# Patient Record
Sex: FEMALE | Race: WHITE | Employment: OTHER | URBAN - METROPOLITAN AREA
[De-identification: names, ages, dates, MRNs, and addresses within clinical notes are randomized per-mention and may not be internally consistent; named-entity substitution may affect disease eponyms.]

---

## 2021-07-24 ENCOUNTER — HOSPITAL ENCOUNTER (EMERGENCY)
Age: 81
Discharge: HOME | End: 2021-07-24
Attending: EMERGENCY MEDICINE

## 2021-07-24 ENCOUNTER — APPOINTMENT (OUTPATIENT)
Dept: GENERAL RADIOLOGY | Age: 81
End: 2021-07-24

## 2021-07-24 ENCOUNTER — APPOINTMENT (OUTPATIENT)
Dept: CT IMAGING | Age: 81
End: 2021-07-24

## 2021-07-24 VITALS
TEMPERATURE: 98.5 F | HEIGHT: 62 IN | OXYGEN SATURATION: 96 % | DIASTOLIC BLOOD PRESSURE: 75 MMHG | HEART RATE: 87 BPM | WEIGHT: 100 LBS | BODY MASS INDEX: 18.4 KG/M2 | RESPIRATION RATE: 20 BRPM | SYSTOLIC BLOOD PRESSURE: 139 MMHG

## 2021-07-24 DIAGNOSIS — S42.221A CLOSED 2-PART DISPLACED FRACTURE OF SURGICAL NECK OF RIGHT HUMERUS, INITIAL ENCOUNTER: Primary | ICD-10-CM

## 2021-07-24 PROCEDURE — 6370000000 HC RX 637 (ALT 250 FOR IP): Performed by: STUDENT IN AN ORGANIZED HEALTH CARE EDUCATION/TRAINING PROGRAM

## 2021-07-24 PROCEDURE — 70450 CT HEAD/BRAIN W/O DYE: CPT

## 2021-07-24 PROCEDURE — 73060 X-RAY EXAM OF HUMERUS: CPT

## 2021-07-24 PROCEDURE — 73030 X-RAY EXAM OF SHOULDER: CPT

## 2021-07-24 PROCEDURE — 99285 EMERGENCY DEPT VISIT HI MDM: CPT

## 2021-07-24 RX ORDER — HYDROCODONE BITARTRATE AND ACETAMINOPHEN 5; 325 MG/1; MG/1
1 TABLET ORAL ONCE
Status: COMPLETED | OUTPATIENT
Start: 2021-07-24 | End: 2021-07-24

## 2021-07-24 RX ORDER — HYDROCODONE BITARTRATE AND ACETAMINOPHEN 5; 325 MG/1; MG/1
1 TABLET ORAL EVERY 6 HOURS PRN
Qty: 10 TABLET | Refills: 0 | Status: SHIPPED | OUTPATIENT
Start: 2021-07-24 | End: 2021-07-27

## 2021-07-24 RX ADMIN — HYDROCODONE BITARTRATE AND ACETAMINOPHEN 1 TABLET: 5; 325 TABLET ORAL at 09:05

## 2021-07-24 RX ADMIN — HYDROCODONE BITARTRATE AND ACETAMINOPHEN 1 TABLET: 5; 325 TABLET ORAL at 13:21

## 2021-07-24 ASSESSMENT — ENCOUNTER SYMPTOMS
NAUSEA: 0
VOMITING: 0
CHEST TIGHTNESS: 0
SINUS PAIN: 0
EYE PAIN: 0
ABDOMINAL PAIN: 0
SHORTNESS OF BREATH: 0
COUGH: 0
SINUS PRESSURE: 0
EYE REDNESS: 0

## 2021-07-24 ASSESSMENT — PAIN DESCRIPTION - FREQUENCY: FREQUENCY: INTERMITTENT

## 2021-07-24 ASSESSMENT — PAIN SCALES - GENERAL
PAINLEVEL_OUTOF10: 8
PAINLEVEL_OUTOF10: 9
PAINLEVEL_OUTOF10: 9

## 2021-07-24 ASSESSMENT — PAIN DESCRIPTION - LOCATION: LOCATION: SHOULDER

## 2021-07-24 ASSESSMENT — PAIN DESCRIPTION - DESCRIPTORS: DESCRIPTORS: SHARP

## 2021-07-24 ASSESSMENT — PAIN DESCRIPTION - ORIENTATION: ORIENTATION: RIGHT

## 2021-07-24 ASSESSMENT — PAIN DESCRIPTION - PAIN TYPE: TYPE: ACUTE PAIN

## 2021-07-24 NOTE — CONSULTS
Department of Orthopedic Surgery  Resident Consult Note    Reason for Consult: Right shoulder pain    HISTORY OF PRESENT ILLNESS:       Patient is a [de-identified] y.o. female, right-hand-dominant, who presents with complaint of right shoulder pain. She sustained a fall in her kitchen yesterday in which she landed on the right upper extremity. She noted immediate pain to the proximal aspect of the humerus. She denies striking her head or any loss of consciousness with the fall. She was able to ambulate after falling without pain to the lower extremities. She also has no pain to the left upper extremity. She has no numbness or tingling to the extremities. No chest pain or difficulty breathing. Patient has no history of surgeries to the extremities. She ambulates without assistive devices. No additional complaints at this time. Past Medical History:    History reviewed. No pertinent past medical history. Past Surgical History:    History reviewed. No pertinent surgical history. Current Medications:   No current facility-administered medications for this encounter. Allergies:  Patient has no known allergies. Social History:   TOBACCO:   reports that she has never smoked. She has never used smokeless tobacco.  ETOH:   reports no history of alcohol use. DRUGS:   reports no history of drug use. ACTIVITIES OF DAILY LIVING: Community ambulator  OCCUPATION: Retired  Family History:   History reviewed. No pertinent family history.     REVIEW OF SYSTEMS:  CONSTITUTIONAL:  negative for  fevers, chills  EYES:  negative for blurred vision, visual disturbance  HEENT:  negative for  hearing loss, voice change  RESPIRATORY:  negative for  dyspnea, wheezing  CARDIOVASCULAR:  negative for  chest pain, palpitations  GASTROINTESTINAL:  negative for nausea, vomiting  GENITOURINARY:  negative for frequency, urinary incontinence  HEMATOLOGIC/LYMPHATIC:  negative for bleeding and petechiae  MUSCULOSKELETAL: See HPI  NEUROLOGICAL: negative for headaches, dizziness  BEHAVIOR/PSYCH:  negative for increased agitation and anxiety    PHYSICAL EXAM:    VITALS:  /75   Pulse 87   Temp 98.5 °F (36.9 °C) (Oral)   Resp 20   Ht 5' 2\" (1.575 m)   Wt 100 lb (45.4 kg)   SpO2 96%   BMI 18.29 kg/m²   CONSTITUTIONAL: Alert, oriented, and cooperative  MUSCULOSKELETAL:  Right upper Extremity:  · Significant ecchymosis to the medial and lateral aspect of the proximal humerus. There is crepitance and tenderness to palpation in this region  · No significant active range of motion to the right shoulder secondary to pain  · Minimal tenderness to palpation over the medial lateral epicondyle, patient reluctant to move her elbow secondary to pain at the fracture site proximally  · Sensation grossly intact in the axillary, median, radial, and ulnar nerve distributions  · Radial pulse +2/4  · Motor function intact to AIN, PIN, median, radial, and ulnar nerves  · No tenderness to palpation about the wrist or hand  · Compartments soft and compressible    Secondary Exam:   · leftUE: No obvious signs of trauma. -TTP to fingers, hand, wrist, forearm, elbow, humerus, shoulder or clavicle. -- Patient able to flex/extend fingers, wrist, elbow and shoulder with active and passive ROM without pain, +2/4 Radial pulse, cap refill <3sec, +AIN/PIN/Radial/Ulnar/Median N, distal sensation grossly intact to C4-T1 dermatomes, compartments soft and compressible. · bilateralLE: No obvious signs of trauma. -TTP to foot, ankle, leg, knee, thigh, hip.-- Patient able to flex/extend toes, ankle, knee and hip with active and passive ROM without pain,+2/4 DP & PT pulses, cap refill <3sec, +5/5 PF/DF/EHL, distal sensation grossly intact to L4-S1 dermatomes, compartments soft and compressible.     · Pelvis: -TTP, -Log roll, -Heel strike     DATA:    CBC: No results found for: WBC, RBC, HGB, HCT, MCV, MCH, MCHC, RDW, PLT, MPV  PT/INR:  No results found for: PROTIME, INR    Radiology Review:  X-ray imaging of the right shoulder and humerus reviewed. There is a comminuted fracture involving the proximal humerus. Predominant fracture line is transverse at the surgical neck. No evident dislocation of the glenohumeral joint. Poor bone quality overall.     IMPRESSION:  · Right, closed, proximal humerus fracture      PLAN:  · Nonweightbearing to right upper extremity  · Right upper extremity placed in cuff and collar  · Ice the affected area  · Pain medications per emergency department  · Follow-up with Dr. Yuliana Webster in clinic in the next week  · Discuss with attending

## 2021-07-24 NOTE — ED PROVIDER NOTES
[address] [facility]  Department of Emergency Medicine     Written by: Julissa Alvarenga DO  Patient Name: Anaya Hart  Attending Provider: Antonia Kaye DO  Admit Date: 2021  8:04 AM  MRN: [id number]                   : 1940        Chief Complaint   Patient presents with   [address] [facility] [address]    Shoulder Pain    - Chief complaint    Ms. Risa Soares is an [de-identified] yo female without significant medical history who presents to the ED due to a fall. Per patient and her son in room, she was sitting in a barstool at home last night around 7 PM when she went to get up, tripped and hit the barstool with her right upper arm as she was falling to the ground. She was in severe, sharp pain on the ground and required her son's assistance to help her to the couch. She and her son both deny any loss of consciousness during this episode. She reported to the ED this morning as she is still in severe, constant, 9/10 pain and now unable to move her right shoulder. Pain is aggravated with any palpation of the right upper arm/shoulder and only slightly relieved at rest and with tylenol. She denies any headache, vision changes, dizziness, lightheadedness, chest pain or shortness of breath. She denies any systemic symptoms. Review of Systems   Constitutional: Negative for chills, fatigue and fever. HENT: Negative for congestion, sinus pressure and sinus pain. Eyes: Negative for pain and redness. Respiratory: Negative for cough, chest tightness and shortness of breath. Cardiovascular: Negative for chest pain, palpitations and leg swelling. Gastrointestinal: Negative for abdominal pain, nausea and vomiting. Genitourinary: Negative for dysuria, flank pain, frequency, hematuria and urgency. Musculoskeletal: Positive for arthralgias (R shoulder), joint swelling (R shoulder) and myalgias (R bicep). Skin: Negative for rash. Neurological: Negative for dizziness, light-headedness and headaches.         Physical Exam  Constitutional:       Appearance: Normal appearance. She is normal weight. HENT:      Head: Normocephalic. Right Ear: External ear normal.      Left Ear: External ear normal.   Eyes:      Extraocular Movements: Extraocular movements intact. Conjunctiva/sclera: Conjunctivae normal.   Cardiovascular:      Rate and Rhythm: Normal rate and regular rhythm. Pulses: Normal pulses. Heart sounds: Normal heart sounds. Pulmonary:      Effort: Pulmonary effort is normal.      Breath sounds: Normal breath sounds. Abdominal:      General: Abdomen is flat. Bowel sounds are normal.      Palpations: Abdomen is soft. Musculoskeletal:         General: Swelling (R shoulder/bicep), tenderness (R shoulder/bicep) and signs of injury present. Cervical back: Normal range of motion and neck supple. Comments: Decreased ROM of R shoulder     Skin:     General: Skin is warm and dry. Capillary Refill: Capillary refill takes less than 2 seconds. Findings: Bruising (Over R bicep) present. No erythema. Neurological:      General: No focal deficit present. Mental Status: She is alert and oriented to person, place, and time. Mental status is at baseline. Psychiatric:         Mood and Affect: Mood normal.         Behavior: Behavior normal.          Procedures       MDM  Number of Diagnoses or Management Options  Closed 2-part displaced fracture of surgical neck of right humerus, initial encounter  Diagnosis management comments: This is an [de-identified] yo female without significant medical history who presents to the ED due to a fall. She remains in pain on presentation. Due to unclear nature of fall and if she hit her head or not, Head CT was ordered. Will also obtain x-ray of right shoulder and humerus. Will give norco for her pain. Head CT was negative. X-ray revealed a comminuted fracture in the surgical neck of the humerus with mild lateral angulation.  Ortho has been consulted and will come down to the department to see her. Ortho placed patient in a sling and patient will be discharged home with instructions to follow up with them as an outpatient.                  --------------------------------------------- PAST HISTORY ---------------------------------------------  Past Medical History:  has no past medical history on file. Past Surgical History:  has no past surgical history on file. Social History:  reports that she has never smoked. She has never used smokeless tobacco. She reports that she does not drink alcohol and does not use drugs. Family History: family history is not on file. The patients home medications have been reviewed. Allergies: Patient has no known allergies. -------------------------------------------------- RESULTS -------------------------------------------------  Labs:  No results found for this visit on 07/24/21. Radiology:  CT Head WO Contrast   Final Result   No acute intracranial abnormality. XR HUMERUS RIGHT (MIN 2 VIEWS)   Final Result   Comminuted fracture in the surgical neck of the humerus with mild lateral   angulation. XR SHOULDER RIGHT (MIN 2 VIEWS)   Final Result   Comminuted fracture in the surgical neck of the humerus with mild lateral   angulation.             ------------------------- NURSING NOTES AND VITALS REVIEWED ---------------------------  Date / Time Roomed:  7/24/2021  8:04 AM  ED Bed Assignment:  03/03    The nursing notes within the ED encounter and vital signs as below have been reviewed.    /75   Pulse 87   Temp 98.5 °F (36.9 °C) (Oral)   Resp 20   Ht 5' 2\" (1.575 m)   Wt 100 lb (45.4 kg)   SpO2 96%   BMI 18.29 kg/m²   Oxygen Saturation Interpretation: Normal      ------------------------------------------ PROGRESS NOTES ------------------------------------------  2:34 PM EDT  I have spoken with the patient and discussed todays results, in addition to providing specific details for the plan of care and counseling regarding the diagnosis and prognosis. Their questions are answered at this time and they are agreeable with the plan. I discussed at length with them reasons for immediate return here for re evaluation. They will followup with their primary care physician and orthopedic physician by calling their office on Monday.      --------------------------------- ADDITIONAL PROVIDER NOTES ---------------------------------  At this time the patient is without objective evidence of an acute process requiring hospitalization or inpatient management. They have remained hemodynamically stable throughout their entire ED visit and are stable for discharge with outpatient follow-up. The plan has been discussed in detail and they are aware of the specific conditions for emergent return, as well as the importance of follow-up. New Prescriptions    HYDROCODONE-ACETAMINOPHEN (NORCO) 5-325 MG PER TABLET    Take 1 tablet by mouth every 6 hours as needed for Pain for up to 3 days. Intended supply: 3 days. Take lowest dose possible to manage pain       Diagnosis:  1. Closed 2-part displaced fracture of surgical neck of right humerus, initial encounter Controlled       Disposition:  Patient's disposition: Discharge to home  Patient's condition is stable. Patient was seen and evaluated by myself and my attending Raoul DO Riki. Assessment and Plan discussed with attending provider, please see attestation for final plan of care.      Marylee Blue, DO Marylee Blue, DO  Resident  07/24/21 [id number]

## 2021-07-24 NOTE — ED NOTES
Bed: 03  Expected date:   Expected time:   Means of arrival:   Comments:  dillon Sylvester RN  07/24/21 [id number]

## 2021-08-02 ENCOUNTER — TELEPHONE (OUTPATIENT)
Dept: ADMINISTRATIVE | Age: 81
End: 2021-08-02

## 2021-08-02 ENCOUNTER — OFFICE VISIT (OUTPATIENT)
Dept: ORTHOPEDIC SURGERY | Age: 81
End: 2021-08-02
Payer: MEDICARE

## 2021-08-02 VITALS — BODY MASS INDEX: 16.66 KG/M2 | TEMPERATURE: 98 F | WEIGHT: 100 LBS | HEIGHT: 65 IN

## 2021-08-02 DIAGNOSIS — S42.211A CLOSED DISPLACED FRACTURE OF SURGICAL NECK OF RIGHT HUMERUS, UNSPECIFIED FRACTURE MORPHOLOGY, INITIAL ENCOUNTER: Primary | ICD-10-CM

## 2021-08-02 DIAGNOSIS — M25.511 ACUTE PAIN OF RIGHT SHOULDER: Primary | ICD-10-CM

## 2021-08-02 PROCEDURE — G8419 CALC BMI OUT NRM PARAM NOF/U: HCPCS | Performed by: ORTHOPAEDIC SURGERY

## 2021-08-02 PROCEDURE — G8400 PT W/DXA NO RESULTS DOC: HCPCS | Performed by: ORTHOPAEDIC SURGERY

## 2021-08-02 PROCEDURE — 1036F TOBACCO NON-USER: CPT | Performed by: ORTHOPAEDIC SURGERY

## 2021-08-02 PROCEDURE — G8427 DOCREV CUR MEDS BY ELIG CLIN: HCPCS | Performed by: ORTHOPAEDIC SURGERY

## 2021-08-02 PROCEDURE — 4040F PNEUMOC VAC/ADMIN/RCVD: CPT | Performed by: ORTHOPAEDIC SURGERY

## 2021-08-02 PROCEDURE — 1090F PRES/ABSN URINE INCON ASSESS: CPT | Performed by: ORTHOPAEDIC SURGERY

## 2021-08-02 PROCEDURE — 23600 CLTX PROX HUMRL FX W/O MNPJ: CPT | Performed by: ORTHOPAEDIC SURGERY

## 2021-08-02 PROCEDURE — 1123F ACP DISCUSS/DSCN MKR DOCD: CPT | Performed by: ORTHOPAEDIC SURGERY

## 2021-08-02 PROCEDURE — 99203 OFFICE O/P NEW LOW 30 MIN: CPT | Performed by: ORTHOPAEDIC SURGERY

## 2021-08-02 RX ORDER — TRAMADOL HYDROCHLORIDE 50 MG/1
50 TABLET ORAL EVERY 6 HOURS PRN
Qty: 28 TABLET | Refills: 0 | Status: ON HOLD
Start: 2021-08-02 | End: 2021-08-09 | Stop reason: HOSPADM

## 2021-08-05 ENCOUNTER — TELEPHONE (OUTPATIENT)
Dept: ORTHOPEDIC SURGERY | Age: 81
End: 2021-08-05

## 2021-08-05 NOTE — TELEPHONE ENCOUNTER
Patient son would like to speak with office in regards to pt not following directions from Dr Crow Dutton. She is laying on the shoulder and making it worse. He wants to know about options for rehab for her. Please contact Siddharth Wei to answer any questions he has.  Thank you

## 2021-08-07 ENCOUNTER — HOSPITAL ENCOUNTER (INPATIENT)
Age: 81
LOS: 1 days | Discharge: NURSING, REHAB OR LONG-TERM CARE | DRG: 641 | End: 2021-08-09
Attending: EMERGENCY MEDICINE | Admitting: INTERNAL MEDICINE
Payer: MEDICARE

## 2021-08-07 ENCOUNTER — APPOINTMENT (OUTPATIENT)
Dept: GENERAL RADIOLOGY | Age: 81
DRG: 641 | End: 2021-08-07
Payer: MEDICARE

## 2021-08-07 ENCOUNTER — APPOINTMENT (OUTPATIENT)
Dept: CT IMAGING | Age: 81
DRG: 641 | End: 2021-08-07
Payer: MEDICARE

## 2021-08-07 DIAGNOSIS — R32 INCONTINENCE OF URINE IN FEMALE: ICD-10-CM

## 2021-08-07 DIAGNOSIS — S42.291D OTHER CLOSED DISPLACED FRACTURE OF PROXIMAL END OF RIGHT HUMERUS WITH ROUTINE HEALING, SUBSEQUENT ENCOUNTER: Primary | ICD-10-CM

## 2021-08-07 DIAGNOSIS — E46 MALNUTRITION, UNSPECIFIED TYPE (HCC): ICD-10-CM

## 2021-08-07 DIAGNOSIS — S42.211G CLOSED DISPLACED FRACTURE OF SURGICAL NECK OF RIGHT HUMERUS WITH DELAYED HEALING, UNSPECIFIED FRACTURE MORPHOLOGY, SUBSEQUENT ENCOUNTER: ICD-10-CM

## 2021-08-07 PROBLEM — R62.7 ADULT FAILURE TO THRIVE: Status: ACTIVE | Noted: 2021-08-07

## 2021-08-07 LAB
ALBUMIN SERPL-MCNC: 4.1 G/DL (ref 3.5–5.2)
ALP BLD-CCNC: 99 U/L (ref 35–104)
ALT SERPL-CCNC: 11 U/L (ref 0–32)
AMMONIA: 10 UMOL/L (ref 11–51)
ANION GAP SERPL CALCULATED.3IONS-SCNC: 9 MMOL/L (ref 7–16)
AST SERPL-CCNC: 17 U/L (ref 0–31)
BACTERIA: ABNORMAL /HPF
BASOPHILS ABSOLUTE: 0.06 E9/L (ref 0–0.2)
BASOPHILS RELATIVE PERCENT: 0.8 % (ref 0–2)
BILIRUB SERPL-MCNC: 0.7 MG/DL (ref 0–1.2)
BILIRUBIN DIRECT: <0.2 MG/DL (ref 0–0.3)
BILIRUBIN URINE: ABNORMAL
BILIRUBIN, INDIRECT: NORMAL MG/DL (ref 0–1)
BLOOD, URINE: ABNORMAL
BUN BLDV-MCNC: 22 MG/DL (ref 6–23)
CALCIUM SERPL-MCNC: 10.3 MG/DL (ref 8.6–10.2)
CHLORIDE BLD-SCNC: 103 MMOL/L (ref 98–107)
CLARITY: CLEAR
CO2: 25 MMOL/L (ref 22–29)
COLOR: ABNORMAL
CREAT SERPL-MCNC: 0.7 MG/DL (ref 0.5–1)
EOSINOPHILS ABSOLUTE: 0.03 E9/L (ref 0.05–0.5)
EOSINOPHILS RELATIVE PERCENT: 0.4 % (ref 0–6)
FERRITIN: 193 NG/ML
FOLATE: 7.8 NG/ML (ref 4.8–24.2)
GFR AFRICAN AMERICAN: >60
GFR NON-AFRICAN AMERICAN: >60 ML/MIN/1.73
GLUCOSE BLD-MCNC: 113 MG/DL (ref 74–99)
GLUCOSE URINE: NEGATIVE MG/DL
HCT VFR BLD CALC: 45 % (ref 34–48)
HEMOGLOBIN: 15.8 G/DL (ref 11.5–15.5)
IMMATURE GRANULOCYTES #: 0.02 E9/L
IMMATURE GRANULOCYTES %: 0.3 % (ref 0–5)
IRON SATURATION: 19 % (ref 15–50)
IRON: 51 MCG/DL (ref 37–145)
KETONES, URINE: 15 MG/DL
LEUKOCYTE ESTERASE, URINE: NEGATIVE
LYMPHOCYTES ABSOLUTE: 1.3 E9/L (ref 1.5–4)
LYMPHOCYTES RELATIVE PERCENT: 17.1 % (ref 20–42)
MCH RBC QN AUTO: 35 PG (ref 26–35)
MCHC RBC AUTO-ENTMCNC: 35.1 % (ref 32–34.5)
MCV RBC AUTO: 99.8 FL (ref 80–99.9)
MONOCYTES ABSOLUTE: 0.6 E9/L (ref 0.1–0.95)
MONOCYTES RELATIVE PERCENT: 7.9 % (ref 2–12)
NEUTROPHILS ABSOLUTE: 5.59 E9/L (ref 1.8–7.3)
NEUTROPHILS RELATIVE PERCENT: 73.5 % (ref 43–80)
NITRITE, URINE: NEGATIVE
PDW BLD-RTO: 14.5 FL (ref 11.5–15)
PH UA: 6 (ref 5–9)
PLATELET # BLD: 380 E9/L (ref 130–450)
PMV BLD AUTO: 8.7 FL (ref 7–12)
POTASSIUM REFLEX MAGNESIUM: 4.3 MMOL/L (ref 3.5–5)
PROTEIN UA: ABNORMAL MG/DL
RBC # BLD: 4.51 E12/L (ref 3.5–5.5)
RBC UA: ABNORMAL /HPF (ref 0–2)
SARS-COV-2, NAAT: NOT DETECTED
SODIUM BLD-SCNC: 137 MMOL/L (ref 132–146)
SPECIFIC GRAVITY UA: 1.02 (ref 1–1.03)
TOTAL IRON BINDING CAPACITY: 274 MCG/DL (ref 250–450)
TOTAL PROTEIN: 6.9 G/DL (ref 6.4–8.3)
TROPONIN, HIGH SENSITIVITY: 9 NG/L (ref 0–9)
UROBILINOGEN, URINE: 1 E.U./DL
VITAMIN B-12: 312 PG/ML (ref 211–946)
VITAMIN D 25-HYDROXY: 7 NG/ML (ref 30–100)
WBC # BLD: 7.6 E9/L (ref 4.5–11.5)
WBC UA: ABNORMAL /HPF (ref 0–5)

## 2021-08-07 PROCEDURE — 82306 VITAMIN D 25 HYDROXY: CPT

## 2021-08-07 PROCEDURE — G0378 HOSPITAL OBSERVATION PER HR: HCPCS

## 2021-08-07 PROCEDURE — 80048 BASIC METABOLIC PNL TOTAL CA: CPT

## 2021-08-07 PROCEDURE — 99223 1ST HOSP IP/OBS HIGH 75: CPT | Performed by: INTERNAL MEDICINE

## 2021-08-07 PROCEDURE — 96372 THER/PROPH/DIAG INJ SC/IM: CPT

## 2021-08-07 PROCEDURE — 82140 ASSAY OF AMMONIA: CPT

## 2021-08-07 PROCEDURE — 87635 SARS-COV-2 COVID-19 AMP PRB: CPT

## 2021-08-07 PROCEDURE — 85025 COMPLETE CBC W/AUTO DIFF WBC: CPT

## 2021-08-07 PROCEDURE — 2580000003 HC RX 258: Performed by: STUDENT IN AN ORGANIZED HEALTH CARE EDUCATION/TRAINING PROGRAM

## 2021-08-07 PROCEDURE — 74178 CT ABD&PLV WO CNTR FLWD CNTR: CPT

## 2021-08-07 PROCEDURE — 6360000002 HC RX W HCPCS: Performed by: NURSE PRACTITIONER

## 2021-08-07 PROCEDURE — 82728 ASSAY OF FERRITIN: CPT

## 2021-08-07 PROCEDURE — 36415 COLL VENOUS BLD VENIPUNCTURE: CPT

## 2021-08-07 PROCEDURE — 6360000004 HC RX CONTRAST MEDICATION: Performed by: RADIOLOGY

## 2021-08-07 PROCEDURE — 80076 HEPATIC FUNCTION PANEL: CPT

## 2021-08-07 PROCEDURE — 99285 EMERGENCY DEPT VISIT HI MDM: CPT

## 2021-08-07 PROCEDURE — 81001 URINALYSIS AUTO W/SCOPE: CPT

## 2021-08-07 PROCEDURE — 82607 VITAMIN B-12: CPT

## 2021-08-07 PROCEDURE — 73030 X-RAY EXAM OF SHOULDER: CPT

## 2021-08-07 PROCEDURE — 96360 HYDRATION IV INFUSION INIT: CPT

## 2021-08-07 PROCEDURE — 84484 ASSAY OF TROPONIN QUANT: CPT

## 2021-08-07 PROCEDURE — APPSS45 APP SPLIT SHARED TIME 31-45 MINUTES: Performed by: NURSE PRACTITIONER

## 2021-08-07 PROCEDURE — 2580000003 HC RX 258: Performed by: NURSE PRACTITIONER

## 2021-08-07 PROCEDURE — 82746 ASSAY OF FOLIC ACID SERUM: CPT

## 2021-08-07 PROCEDURE — 71045 X-RAY EXAM CHEST 1 VIEW: CPT

## 2021-08-07 PROCEDURE — 83550 IRON BINDING TEST: CPT

## 2021-08-07 PROCEDURE — 83540 ASSAY OF IRON: CPT

## 2021-08-07 PROCEDURE — 93005 ELECTROCARDIOGRAM TRACING: CPT | Performed by: NURSE PRACTITIONER

## 2021-08-07 PROCEDURE — 6370000000 HC RX 637 (ALT 250 FOR IP): Performed by: NURSE PRACTITIONER

## 2021-08-07 RX ORDER — SODIUM CHLORIDE 9 MG/ML
25 INJECTION, SOLUTION INTRAVENOUS PRN
Status: DISCONTINUED | OUTPATIENT
Start: 2021-08-07 | End: 2021-08-09 | Stop reason: HOSPADM

## 2021-08-07 RX ORDER — ACETAMINOPHEN 650 MG/1
650 SUPPOSITORY RECTAL EVERY 6 HOURS PRN
Status: DISCONTINUED | OUTPATIENT
Start: 2021-08-07 | End: 2021-08-09 | Stop reason: HOSPADM

## 2021-08-07 RX ORDER — 0.9 % SODIUM CHLORIDE 0.9 %
1000 INTRAVENOUS SOLUTION INTRAVENOUS ONCE
Status: COMPLETED | OUTPATIENT
Start: 2021-08-07 | End: 2021-08-07

## 2021-08-07 RX ORDER — SODIUM CHLORIDE 9 MG/ML
INJECTION, SOLUTION INTRAVENOUS CONTINUOUS
Status: DISCONTINUED | OUTPATIENT
Start: 2021-08-07 | End: 2021-08-08

## 2021-08-07 RX ORDER — TRAMADOL HYDROCHLORIDE 50 MG/1
50 TABLET ORAL EVERY 6 HOURS PRN
Status: DISCONTINUED | OUTPATIENT
Start: 2021-08-07 | End: 2021-08-09 | Stop reason: HOSPADM

## 2021-08-07 RX ORDER — SODIUM CHLORIDE 0.9 % (FLUSH) 0.9 %
5-40 SYRINGE (ML) INJECTION PRN
Status: DISCONTINUED | OUTPATIENT
Start: 2021-08-07 | End: 2021-08-09 | Stop reason: HOSPADM

## 2021-08-07 RX ORDER — M-VIT,TX,IRON,MINS/CALC/FOLIC 27MG-0.4MG
1 TABLET ORAL DAILY
Status: DISCONTINUED | OUTPATIENT
Start: 2021-08-07 | End: 2021-08-09 | Stop reason: HOSPADM

## 2021-08-07 RX ORDER — ONDANSETRON 2 MG/ML
4 INJECTION INTRAMUSCULAR; INTRAVENOUS EVERY 6 HOURS PRN
Status: DISCONTINUED | OUTPATIENT
Start: 2021-08-07 | End: 2021-08-09 | Stop reason: HOSPADM

## 2021-08-07 RX ORDER — ACETAMINOPHEN 325 MG/1
650 TABLET ORAL EVERY 6 HOURS PRN
Status: DISCONTINUED | OUTPATIENT
Start: 2021-08-07 | End: 2021-08-09 | Stop reason: HOSPADM

## 2021-08-07 RX ORDER — GAUZE BANDAGE 2" X 2"
100 BANDAGE TOPICAL DAILY
Status: DISCONTINUED | OUTPATIENT
Start: 2021-08-07 | End: 2021-08-09 | Stop reason: HOSPADM

## 2021-08-07 RX ORDER — SODIUM CHLORIDE 0.9 % (FLUSH) 0.9 %
5-40 SYRINGE (ML) INJECTION EVERY 12 HOURS SCHEDULED
Status: DISCONTINUED | OUTPATIENT
Start: 2021-08-07 | End: 2021-08-09 | Stop reason: HOSPADM

## 2021-08-07 RX ORDER — ONDANSETRON 4 MG/1
4 TABLET, ORALLY DISINTEGRATING ORAL EVERY 8 HOURS PRN
Status: DISCONTINUED | OUTPATIENT
Start: 2021-08-07 | End: 2021-08-09 | Stop reason: HOSPADM

## 2021-08-07 RX ORDER — POLYETHYLENE GLYCOL 3350 17 G/17G
17 POWDER, FOR SOLUTION ORAL DAILY PRN
Status: DISCONTINUED | OUTPATIENT
Start: 2021-08-07 | End: 2021-08-09 | Stop reason: HOSPADM

## 2021-08-07 RX ADMIN — Medication 1 TABLET: at 20:07

## 2021-08-07 RX ADMIN — SODIUM CHLORIDE 1000 ML: 9 INJECTION, SOLUTION INTRAVENOUS at 13:12

## 2021-08-07 RX ADMIN — SODIUM CHLORIDE: 9 INJECTION, SOLUTION INTRAVENOUS at 20:10

## 2021-08-07 RX ADMIN — SODIUM CHLORIDE, PRESERVATIVE FREE 10 ML: 5 INJECTION INTRAVENOUS at 20:08

## 2021-08-07 RX ADMIN — SODIUM CHLORIDE: 9 INJECTION, SOLUTION INTRAVENOUS at 16:10

## 2021-08-07 RX ADMIN — IOPAMIDOL 75 ML: 755 INJECTION, SOLUTION INTRAVENOUS at 19:28

## 2021-08-07 RX ADMIN — Medication 100 MG: at 20:03

## 2021-08-07 RX ADMIN — ENOXAPARIN SODIUM 40 MG: 40 INJECTION SUBCUTANEOUS at 20:03

## 2021-08-07 ASSESSMENT — PAIN SCALES - GENERAL
PAINLEVEL_OUTOF10: 0
PAINLEVEL_OUTOF10: 9

## 2021-08-07 ASSESSMENT — PAIN DESCRIPTION - DESCRIPTORS: DESCRIPTORS: CONSTANT;ACHING

## 2021-08-07 ASSESSMENT — PAIN DESCRIPTION - ONSET: ONSET: ON-GOING

## 2021-08-07 ASSESSMENT — PAIN DESCRIPTION - PAIN TYPE: TYPE: ACUTE PAIN

## 2021-08-07 ASSESSMENT — PAIN DESCRIPTION - LOCATION: LOCATION: ARM;SHOULDER

## 2021-08-07 ASSESSMENT — PAIN DESCRIPTION - ORIENTATION: ORIENTATION: RIGHT

## 2021-08-07 ASSESSMENT — PAIN DESCRIPTION - FREQUENCY: FREQUENCY: CONTINUOUS

## 2021-08-07 NOTE — LETTER
[place] Department Medicaid  CERTIFICATION OF NECESSITY  FOR NON-EMERGENCY TRANSPORTATION   BY [facility]      Individual Information   1. Name: Anaya Hart 2. [place] Medicaid Billing Number: ***   3. Address: [address] [place] [address]      Transportation Provider Information   4. Provider Name: ***   5. [place] Medicaid Provider Number: *** National Provider Identifier (NPI): ***     Certification  7. Criteria:  During transport, this individual requires:  [x] Medical treatment or continuous     supervision by an EMT. [] The administration or regulation of oxygen by another person. [] Supervised protective restraint. 8. Period Beginning Date: 8/9/2021   9. Length  [x] Not more than 1 day(s)  [] One Year     Additional Information Relevant to Certification   10. Comments or Explanations, If Necessary or Appropriate   Dementia   Failure to thrive     Certifying Practitioner Information   11. Name of Practitioner: Dr. Juan Cox MD   12. [place] Medicaid Provider Number, If Applicable: *** 13. National Provider Identifier (NPI): ***     Signature Information   14. Date of Signature: 8/9/2021 15. Name of Person Signing: Didier Neff RN   12.  Signature and Professional DesignationFechris Abbott RN     [facility] [id number]  Rev. 7/2015

## 2021-08-07 NOTE — CARE COORDINATION
Ss note:8/7/2021.11:53 AM Pt is in ER rm 6. SW met with pt, her son Emma Harris [phone], and Dr. Sydney Milligan for transition of care needs. Pt resides with her other son Ese Jarrett, has a fx of her shoulder. Kia Rouse relays pt has been lying on couch, is incontinent and not eating much. Physician to order labs and therapy evals as pt currently not doing well at home, no hx of SNF. Son requesting rehab stay, prefers [facility]. Initial referral made to liaison [facility], will await therapy evals, chart review and acceptance, NO PRECERT. Pt aware sw is working with son for rehab placement. Unit bin/juice to follow up with facility and son on Monday.  HALEY Latham

## 2021-08-07 NOTE — H&P
[address] [place], [place] [address]ist Group   History and Physical      CHIEF COMPLAINT:  Right shoulder pain    History of Present Illness:  [de-identified] y.o. female with a history of closed displaced fracture of surgical neck of right humerus on 22 and possible dementia presents with right shoulder pain. Since the patient fractured her humerus she has not been ambulating and has been laying on the couch. Her son reports that she was supposed to be sitting upright to assist with fracture healing but has been laying down. She has not been eating, not bathing and having bowel and urine incontinence. She denies any medical history. Her son reports some possible mild dementia. She is a current smoker and drinks two shots of vodka daily but has not drank any alcohol since 21. An xray was done in the Emergency Department and showed common fracture of proximal humerus with approximately 4 cm overlap. Family is requesting rehab placement. Informant(s) for H&P: Patient and son    REVIEW OF SYSTEMS:  no fevers, chills, cp, sob, n/v, ha, vision/hearing changes, hot/cold flashes, other open skin lesions, constipation, dysuria/hematuria unless noted in HPI. Complete ROS performed with the patient and is otherwise negative. PMH:  Past Medical History:   Diagnosis Date    Fracture closed, humerus        Surgical History:  Past Surgical History:   Procedure Laterality Date     SECTION         Medications Prior to Admission:    Prior to Admission medications    Medication Sig Start Date End Date Taking? Authorizing Provider   traMADol (ULTRAM) 50 MG tablet Take 1 tablet by mouth every 6 hours as needed for Pain for up to 7 days. Intended supply: 7 days. Take lowest dose possible to manage pain 21 Yes Marty Mckeon DO       Allergies:    Patient has no known allergies. Social History:    reports that she has been smoking cigarettes. She has a 8.75 pack-year smoking history.  She has never used smokeless tobacco. She reports current alcohol use. She reports that she does not use drugs. Family History:   family history is not on file. Patient denies any family history and said that both of her parents  of old age. PHYSICAL EXAM:  Vitals:  BP (!) 142/86   Pulse 92   Temp 97.9 °F (36.6 °C) (Oral)   Resp 14   Ht 5' 5\" (1.651 m)   Wt 94 lb 8 oz (42.9 kg)   SpO2 100%   BMI 15.73 kg/m²     General Appearance: alert and oriented to person, place, and in no acute distress  Skin: warm and dry, ecchymosis to right arm and shoulder  Head: normocephalic and atraumatic  Eyes: pupils equal, round, and reactive to light, conjunctivae normal  Neck: neck supple and non tender without mass   Pulmonary/Chest: clear to auscultation bilaterally- no wheezes, rales or rhonchi, normal air movement, no respiratory distress  Cardiovascular: normal rate, normal S1 and S2   Abdomen: soft, non-tender, non-distended, normal bowel sounds, no masses or organomegaly  Extremities: no cyanosis, no clubbing, right shoulder deformity in sling with ecchymosis  Neurologic: no cranial nerve deficit and speech normal    LABS:  Recent Labs     21  1058      K 4.3      CO2 25   BUN 22   CREATININE 0.7   GLUCOSE 113*   CALCIUM 10.3*       Recent Labs     21  1058   WBC 7.6   RBC 4.51   HGB 15.8*   HCT 45.0   MCV 99.8   MCH 35.0   MCHC 35.1*   RDW 14.5      MPV 8.7       No results for input(s): POCGLU in the last 72 hours. Radiology: XR SHOULDER RIGHT (MIN 2 VIEWS)    Result Date: 2021  EXAMINATION: THREE XRAY VIEWS OF THE RIGHT SHOULDER 2021 12:03 pm COMPARISON: None. HISTORY: ORDERING SYSTEM PROVIDED HISTORY: right shoulder pain TECHNOLOGIST PROVIDED HISTORY: Reason for exam:->right shoulder pain FINDINGS: Comminuted fracture identified in the right proximal humerus approximately 4 cm overlap. Glenohumeral joint and acromioclavicular joint appears intact.  No pneumothorax in the partially visualized lung. Common fracture of proximal humerus with approximately 4 cm overlap. XR CHEST 1 VIEW    Result Date: 8/7/2021  EXAMINATION: ONE XRAY VIEW OF THE CHEST 8/7/2021 12:03 pm COMPARISON: None. HISTORY: ORDERING SYSTEM PROVIDED HISTORY: possible admission TECHNOLOGIST PROVIDED HISTORY: Reason for exam:->possible admission FINDINGS: Opacity overlying the right mid lung is likely outside the patient's body. The lungs are without acute focal process. There is no effusion or pneumothorax. The cardiomediastinal silhouette is without acute process. Fracture identified in the right proximal humerus. No acute pulmonary process. No pneumothorax. Fracture identified in the right proximal humerus. Please referred to dedicated humerus radiograph reports for further detail. EKG: Ordered    ASSESSMENT:      Principal Problem:    Adult failure to thrive  Active Problems:    Fracture closed, humerus    Urinary and bowel incontinence  Resolved Problems:    * No resolved hospital problems. *      PLAN:    1. Adult failure to thrive:  Patient has not been eating or drinking well since 7/24/21. Consult Dietician. Supplements ordered until evaluation. 2. Closed displaced fracture of proximal end of right humerus:  Fracture occurred on 7/24/21 and patient followed up with Dr. Pedro Siegel in the office. Consult Orthopedic Surgery. 3. Incontinence: Urinalysis not indicative of UTI. Check CT of the abdomen and pelvis. 4. Hyperglycemia:  Blood sugar is elevated. Check Hgb A1C.   5. Alcohol use:  Patient drinks two shots of vodka daily, but has not had any alcohol since 7/24/21. CIWA scale if needed. Check Vitamin B-12 and folate. Start thiamine and multivitamin. 6. Tobacco dependence: Smoking cessation education. Nicotine patch if needed. Code Status: Full  DVT prophylaxis: Lovenox    NOTE: This report was transcribed using voice recognition software.  Every effort was made to ensure accuracy; however, inadvertent computerized transcription errors may be present.     Electronically signed by JANET Zimmerman CNP on 8/7/2021 at 4:10 PM

## 2021-08-07 NOTE — ED PROVIDER NOTES
Department of Emergency Medicine   ED  Provider Note  Admit Date/RoomTime: 8/7/2021  9:52 AM  ED Room: 06/06          History of Present Illness:  8/7/21, Time: 10:19 AM EDT  Chief Complaint   Patient presents with    Other     to er via ems from home for evaluation. has a fx right upper arm.wont sit up as she is supposed to for healing. she has been wetting herself. Anaya Hart is a poorly nourished appearing [de-identified] y.o. female presenting to the ED for for reevaluation of right shoulder pain after a previous proximal humerus fracture on 7/24/2021. The complaint has been persistent, mild in severity, and worsened by movement of the affected extremity. Patient presents to the ED by EMS, who stated that she will sit up for proper fracture healing and alignment. She also has been experiencing urinary incontinence. She states she lives at home with her 2 sons. She states that she is taking tramadol 50 mg for the pain at home. She denies any significant past medical history. No other complaints today. Her son and Ursula Bridges is in the room. He states that his mother has not been bathing, not been eating, and having consistent urinary incontinence. He states that his mother's PCP just retired a couple weeks ago. And is trying to get his mom into a skilled care facility in order to properly manage her ongoing conditions. Review of Systems:   Pertinent positives and negatives are stated within HPI, all other systems reviewed and are negative.        --------------------------------------------- PAST HISTORY ---------------------------------------------  Past Medical History:  has no past medical history on file. Past Surgical History:  has no past surgical history on file. Social History:  reports that she has never smoked. She has never used smokeless tobacco. She reports that she does not drink alcohol and does not use drugs. Family History: family history is not on file. . Unless otherwise noted, family history is non contributory    The patients home medications have been reviewed. Allergies: Patient has no known allergies. I have reviewed the past medical history, past surgical history, social history, and family history    ---------------------------------------------------PHYSICAL EXAM--------------------------------------    Constitutional/General: Alert and oriented to self and place. Malnourished appearing  Head: Normocephalic and atraumatic  Eyes: PERRL, EOMI, sclera non icteric  Mouth: Oropharynx clear, handling secretions, no trismus, no asymmetry of the posterior oropharynx or uvular edema  Neck: Supple, full ROM, no stridor, no meningeal signs  Respiratory: Lungs clear to auscultation bilaterally, no wheezes, rales, or rhonchi. Not in respiratory distress  Cardiovascular:  Regular rate. Regular rhythm. No murmurs, no aortic murmurs, no gallops, or rubs. 2+ distal pulses. Equal extremity pulses. Chest: No chest wall tenderness  Gastrointestinal:  Abdomen Soft, Non tender, Non distended. No rebound, guarding, or rigidity. No pulsatile masses. Musculoskeletal: Sling placed on the right arm. Tenderness to palpation about the right shoulder. She she moves the other 3 extremities independently. Skin warm and well perfused, no clubbing, no cyanosis, no edema. Capillary refill <3 seconds. Skin: skin warm and dry. No rashes. Ecchymosis over the right proximal arm. Neurologic: GCS 15, no focal deficits, symmetric strength 5/5 in the upper and lower extremities bilaterally  Psychiatric: Normal Affect          -------------------------------------------------- RESULTS -------------------------------------------------  I have personally reviewed all laboratory and imaging results for this patient. Results are listed below.      LABS: (Lab results interpreted by me)  Results for orders placed or performed during the hospital encounter of 08/07/21   COVID-19, Rapid    Specimen: Nasopharyngeal Swab   Result Value Ref Range    SARS-CoV-2, NAAT Not Detected Not Detected   CBC Auto Differential   Result Value Ref Range    WBC 7.6 4.5 - 11.5 E9/L    RBC 4.51 3.50 - 5.50 E12/L    Hemoglobin 15.8 (H) 11.5 - 15.5 g/dL    Hematocrit 45.0 34.0 - 48.0 %    MCV 99.8 80.0 - 99.9 fL    MCH 35.0 26.0 - 35.0 pg    MCHC 35.1 (H) 32.0 - 34.5 %    RDW 14.5 11.5 - 15.0 fL    Platelets 116 615 - 326 E9/L    MPV 8.7 7.0 - 12.0 fL    Neutrophils % 73.5 43.0 - 80.0 %    Immature Granulocytes % 0.3 0.0 - 5.0 %    Lymphocytes % 17.1 (L) 20.0 - 42.0 %    Monocytes % 7.9 2.0 - 12.0 %    Eosinophils % 0.4 0.0 - 6.0 %    Basophils % 0.8 0.0 - 2.0 %    Neutrophils Absolute 5.59 1.80 - 7.30 E9/L    Immature Granulocytes # 0.02 E9/L    Lymphocytes Absolute 1.30 (L) 1.50 - 4.00 E9/L    Monocytes Absolute 0.60 0.10 - 0.95 E9/L    Eosinophils Absolute 0.03 (L) 0.05 - 0.50 E9/L    Basophils Absolute 0.06 0.00 - 0.20 Y3/U   Basic Metabolic Panel w/ Reflex to MG   Result Value Ref Range    Sodium 137 132 - 146 mmol/L    Potassium reflex Magnesium 4.3 3.5 - 5.0 mmol/L    Chloride 103 98 - 107 mmol/L    CO2 25 22 - 29 mmol/L    Anion Gap 9 7 - 16 mmol/L    Glucose 113 (H) 74 - 99 mg/dL    BUN 22 6 - 23 mg/dL    CREATININE 0.7 0.5 - 1.0 mg/dL    GFR Non-African American >60 >=60 mL/min/1.73    GFR African American >60     Calcium 10.3 (H) 8.6 - 10.2 mg/dL   Urinalysis, reflex to microscopic   Result Value Ref Range    Color, UA ENRIQUETA (A) Straw/Yellow    Clarity, UA Clear Clear    Glucose, Ur Negative Negative mg/dL    Bilirubin Urine SMALL (A) Negative    Ketones, Urine 15 (A) Negative mg/dL    Specific Gravity, UA 1.025 1.005 - 1.030    Blood, Urine TRACE (A) Negative    pH, UA 6.0 5.0 - 9.0    Protein, UA TRACE Negative mg/dL    Urobilinogen, Urine 1.0 <2.0 E.U./dL    Nitrite, Urine Negative Negative    Leukocyte Esterase, Urine Negative Negative   Microscopic Urinalysis   Result Value Ref Range    WBC, UA 0-1 0 - 5 /HPF    RBC, UA 0-1 0 - 2 /HPF    Bacteria, UA RARE (A) None Seen /HPF   Hepatic function panel   Result Value Ref Range    Total Protein 6.9 6.4 - 8.3 g/dL    Albumin 4.1 3.5 - 5.2 g/dL    Alkaline Phosphatase 99 35 - 104 U/L    ALT 11 0 - 32 U/L    AST 17 0 - 31 U/L    Total Bilirubin 0.7 0.0 - 1.2 mg/dL    Bilirubin, Direct <0.2 0.0 - 0.3 mg/dL    Bilirubin, Indirect see below 0.0 - 1.0 mg/dL   ,       RADIOLOGY:  Interpreted by Radiologist unless otherwise specified  XR CHEST 1 VIEW   Final Result   No acute pulmonary process. No pneumothorax. Fracture identified in the right proximal humerus. Please referred to   dedicated humerus radiograph reports for further detail. XR SHOULDER RIGHT (MIN 2 VIEWS)   Final Result   Common fracture of proximal humerus with approximately 4 cm overlap.                   ------------------------- NURSING NOTES AND VITALS REVIEWED ---------------------------   The nursing notes within the ED encounter and vital signs as below have been reviewed by myself  /83   Pulse 110   Temp 97.9 °F (36.6 °C) (Temporal)   Resp 24   Ht 5' 5\" (1.651 m)   Wt 94 lb 8 oz (42.9 kg)   SpO2 98%   BMI 15.73 kg/m²     Oxygen Saturation Interpretation: 98% on room air. Normal    The patients available past medical records and past encounters were reviewed. ------------------------------ ED COURSE/MEDICAL DECISION MAKING----------------------  Medications   0.9 % sodium chloride bolus (has no administration in time range)                 Medical Decision Making:     The patient was seen and evaluated by the Attending Emergency Medicine Physician Dr. Giselle Darden      Patient presents for right shoulder pain status post fall 2 weeks ago resulting in a right proximal humerus fracture. She is also having urinary incontinence. Her son, Clora Meckel, is seeking to get his mom into a skilled care facility.   Reimaging of the right shoulder as well as chest x-ray, CBC, CMP, UA, ammonia and rapid Covid were ordered. Urinalysis resulted with small amount of bilirubin, ketones and blood in the urine. Reflex microscopic showed rare bacteria. Ammonia showed no acute abnormalities. Rest of the labs for evaluation was noncontributory. This patient has had acute worsening since the time of our initial entry. The family feels that she needs a higher level of care at a facility and will be to further address her problems. Social work was consulted and they are working on getting her placed in a skilled care facility. This clinically dehydrated patient well  need admitted for a 3-day stay to be qualified for transfer to nursing home. The hospitalist was called for admission. Re-Evaluations:  ED Course as of Aug 07 1206   Sat Aug 07, 2021   1151 ATTENDING PROVIDER ATTESTATION:     I have personally performed and/or participated in the history, exam, medical decision making, and procedures and agree with all pertinent clinical information unless otherwise noted. I have also reviewed and agree with the past medical, family and social history unless otherwise noted. I have discussed this patient in detail with the resident, and provided the instruction and education regarding patient here for evaluation for weakness and fatigue with poor oral intake and trouble functioning at home. Recent right humerus fracture, not compliant with recommended therapy for it. .  My findings/plan: Patient is a cachectic, malnourished, mildly dehydrated female with dry lips and dry oral mucosa laying in the bed in no distress with no sign of acute head or face injury. Right arm in sling with yellowish bruising to the proximal arm. Heart rate mildly tachycardic. Lungs are clear and equal.  Abdomen soft and nontender with no distention. She has no jaundice or icterus. She is awake and alert and oriented. Overall fatigued appearing.           [NC]      ED Course User Index  [NC] Angel Shabazz, DO         This patient's ED course included: a personal history and physicial examination    This patient has remained hemodynamically stable during their ED course. Consultations:  Social work          Counseling: The emergency provider has spoken with the patient and family member patient and son and discussed todays results, in addition to providing specific details for the plan of care and counseling regarding the diagnosis and prognosis. Questions are answered at this time and they are agreeable with the plan.       --------------------------------- IMPRESSION AND DISPOSITION ---------------------------------    IMPRESSION  1. Other closed displaced fracture of proximal end of right humerus with routine healing, subsequent encounter    2. Malnutrition, unspecified type ([address]    3. Incontinence of urine in female        DISPOSITION  Disposition: Admit to med/surg floor  Patient condition is stable        NOTE: This report was transcribed using voice recognition software.  Every effort was made to ensure accuracy; however, inadvertent computerized transcription errors may be present       DO Roxy Mccall  08/07/21 3509

## 2021-08-07 NOTE — Clinical Note
Patient Class: Observation [104]   REQUIRED: Diagnosis: Adult failure to thrive Eve[place]. 7. ICD-9-CM]   Estimated Length of Stay: Estimated stay of more than 2 midnights   Admitting Provider: Summer Barillas [[id number]]   Telemetry/Cardiac Monitoring Required?: No

## 2021-08-08 PROBLEM — R62.7 FAILURE TO THRIVE IN ADULT: Status: ACTIVE | Noted: 2021-08-08

## 2021-08-08 LAB
ALBUMIN SERPL-MCNC: 3.6 G/DL (ref 3.5–5.2)
ALP BLD-CCNC: 93 U/L (ref 35–104)
ALT SERPL-CCNC: 11 U/L (ref 0–32)
ANION GAP SERPL CALCULATED.3IONS-SCNC: 11 MMOL/L (ref 7–16)
ANION GAP SERPL CALCULATED.3IONS-SCNC: 9 MMOL/L (ref 7–16)
AST SERPL-CCNC: 19 U/L (ref 0–31)
BILIRUB SERPL-MCNC: 0.8 MG/DL (ref 0–1.2)
BILIRUBIN DIRECT: <0.2 MG/DL (ref 0–0.3)
BILIRUBIN, INDIRECT: ABNORMAL MG/DL (ref 0–1)
BUN BLDV-MCNC: 12 MG/DL (ref 6–23)
BUN BLDV-MCNC: 15 MG/DL (ref 6–23)
CALCIUM SERPL-MCNC: 9.3 MG/DL (ref 8.6–10.2)
CALCIUM SERPL-MCNC: 9.5 MG/DL (ref 8.6–10.2)
CHLORIDE BLD-SCNC: 103 MMOL/L (ref 98–107)
CHLORIDE BLD-SCNC: 105 MMOL/L (ref 98–107)
CO2: 18 MMOL/L (ref 22–29)
CO2: 18 MMOL/L (ref 22–29)
CREAT SERPL-MCNC: 0.5 MG/DL (ref 0.5–1)
CREAT SERPL-MCNC: 0.5 MG/DL (ref 0.5–1)
EKG ATRIAL RATE: 94 BPM
EKG P AXIS: 75 DEGREES
EKG P-R INTERVAL: 152 MS
EKG Q-T INTERVAL: 368 MS
EKG QRS DURATION: 106 MS
EKG QTC CALCULATION (BAZETT): 460 MS
EKG R AXIS: 58 DEGREES
EKG T AXIS: 46 DEGREES
EKG VENTRICULAR RATE: 94 BPM
GFR AFRICAN AMERICAN: >60
GFR AFRICAN AMERICAN: >60
GFR NON-AFRICAN AMERICAN: >60 ML/MIN/1.73
GFR NON-AFRICAN AMERICAN: >60 ML/MIN/1.73
GLUCOSE BLD-MCNC: 157 MG/DL (ref 74–99)
GLUCOSE BLD-MCNC: 75 MG/DL (ref 74–99)
HBA1C MFR BLD: 4.4 % (ref 4–5.6)
HCT VFR BLD CALC: 42 % (ref 34–48)
HEMOGLOBIN: 14.1 G/DL (ref 11.5–15.5)
INR BLD: 1.1
MCH RBC QN AUTO: 34.5 PG (ref 26–35)
MCHC RBC AUTO-ENTMCNC: 33.6 % (ref 32–34.5)
MCV RBC AUTO: 102.7 FL (ref 80–99.9)
PDW BLD-RTO: 14.3 FL (ref 11.5–15)
PLATELET # BLD: 339 E9/L (ref 130–450)
PMV BLD AUTO: 8.9 FL (ref 7–12)
POTASSIUM SERPL-SCNC: 3.9 MMOL/L (ref 3.5–5)
POTASSIUM SERPL-SCNC: 5.1 MMOL/L (ref 3.5–5)
PROTHROMBIN TIME: 12.8 SEC (ref 9.3–12.4)
RBC # BLD: 4.09 E12/L (ref 3.5–5.5)
SODIUM BLD-SCNC: 130 MMOL/L (ref 132–146)
SODIUM BLD-SCNC: 134 MMOL/L (ref 132–146)
TOTAL PROTEIN: 6.2 G/DL (ref 6.4–8.3)
WBC # BLD: 7.5 E9/L (ref 4.5–11.5)

## 2021-08-08 PROCEDURE — 93010 ELECTROCARDIOGRAM REPORT: CPT | Performed by: INTERNAL MEDICINE

## 2021-08-08 PROCEDURE — APPSS30 APP SPLIT SHARED TIME 16-30 MINUTES: Performed by: NURSE PRACTITIONER

## 2021-08-08 PROCEDURE — 85610 PROTHROMBIN TIME: CPT

## 2021-08-08 PROCEDURE — 80048 BASIC METABOLIC PNL TOTAL CA: CPT

## 2021-08-08 PROCEDURE — 6360000002 HC RX W HCPCS: Performed by: NURSE PRACTITIONER

## 2021-08-08 PROCEDURE — 97161 PT EVAL LOW COMPLEX 20 MIN: CPT | Performed by: PHYSICAL THERAPIST

## 2021-08-08 PROCEDURE — 80076 HEPATIC FUNCTION PANEL: CPT

## 2021-08-08 PROCEDURE — 2500000003 HC RX 250 WO HCPCS: Performed by: NURSE PRACTITIONER

## 2021-08-08 PROCEDURE — 2580000003 HC RX 258: Performed by: NURSE PRACTITIONER

## 2021-08-08 PROCEDURE — 97530 THERAPEUTIC ACTIVITIES: CPT | Performed by: PHYSICAL THERAPIST

## 2021-08-08 PROCEDURE — 36415 COLL VENOUS BLD VENIPUNCTURE: CPT

## 2021-08-08 PROCEDURE — 85027 COMPLETE CBC AUTOMATED: CPT

## 2021-08-08 PROCEDURE — 1200000000 HC SEMI PRIVATE

## 2021-08-08 PROCEDURE — 99233 SBSQ HOSP IP/OBS HIGH 50: CPT | Performed by: INTERNAL MEDICINE

## 2021-08-08 PROCEDURE — 83036 HEMOGLOBIN GLYCOSYLATED A1C: CPT

## 2021-08-08 PROCEDURE — 6370000000 HC RX 637 (ALT 250 FOR IP): Performed by: NURSE PRACTITIONER

## 2021-08-08 RX ORDER — ERGOCALCIFEROL 1.25 MG/1
50000 CAPSULE ORAL WEEKLY
Status: DISCONTINUED | OUTPATIENT
Start: 2021-08-08 | End: 2021-08-09 | Stop reason: HOSPADM

## 2021-08-08 RX ADMIN — SODIUM BICARBONATE: 84 INJECTION, SOLUTION INTRAVENOUS at 10:18

## 2021-08-08 RX ADMIN — TRAMADOL HYDROCHLORIDE 50 MG: 50 TABLET, FILM COATED ORAL at 10:11

## 2021-08-08 RX ADMIN — ENOXAPARIN SODIUM 40 MG: 40 INJECTION SUBCUTANEOUS at 10:11

## 2021-08-08 RX ADMIN — Medication 100 MG: at 10:11

## 2021-08-08 RX ADMIN — ENOXAPARIN SODIUM 40 MG: 40 INJECTION SUBCUTANEOUS at 18:38

## 2021-08-08 RX ADMIN — SODIUM CHLORIDE: 9 INJECTION, SOLUTION INTRAVENOUS at 05:23

## 2021-08-08 RX ADMIN — ERGOCALCIFEROL 50000 UNITS: 1.25 CAPSULE ORAL at 10:16

## 2021-08-08 RX ADMIN — Medication 1 TABLET: at 10:11

## 2021-08-08 ASSESSMENT — PAIN SCALES - GENERAL
PAINLEVEL_OUTOF10: 1
PAINLEVEL_OUTOF10: 9
PAINLEVEL_OUTOF10: 2

## 2021-08-08 NOTE — PROGRESS NOTES
repetition  Sensation:  Patient  denies numbness/tingling     Edema:  no    Endurance: poor           Patient education  Patient educated on role of Physical Therapy, risks of immobility, safety and plan of care and  importance of mobility while in hospital       Patient response to education:   Pt verbalized understanding Pt demonstrated skill Pt requires further education in this area   Yes Partial Yes      Treatment:  Patient practiced and was instructed/facilitated in the following treatment: Patient   Sat edge of bed 5 minutes with Moderate assist of 1 to increase dynamic sitting balance and activity tolerance. Up in chair postion for 10 min; poor tolerance d/t pain     Therapeutic Exercises:  ankle pumps  x 10 reps. At end of session, patient in care of nursing with alarm call light and phone within reach,   all lines and tubes intact, nursing notified. Patient would benefit from continued skilled Physical Therapy to improve functional independence and quality of life. Patient's/ family goals   home        Time in  1014  Time out 1046    Total Treatment Time  12 minutes    Evaluation time includes thorough review of current medical information, gathering information on past medical history/social history and prior level of function, completion of standardized testing/informal observation of tasks, assessment of data, and development of Plan of care and goals.      CPT codes:  Low Complexity PT evaluation (56792)  Therapeutic activities (12329)   12 minutes  1 unit(s)    Ardie Labs, PT

## 2021-08-08 NOTE — PROGRESS NOTES
[address] [place], [place] [address]ist   Progress Note    Admitting Date and Time: 8/7/2021  9:52 AM  Admit Dx: Adult failure to thrive [R62.7]  Incontinence of urine in female [R32]  Other closed displaced fracture of proximal end of right humerus with routine healing, subsequent encounter [S49.620D]  Malnutrition, unspecified type ([address] [E46]    Subjective:    Patient seen and examined. She complains of continued arm and shoulder pain. Orthopedic Surgery is not planning on surgical intervention and continue with conservative treatment. Plan is for discharge to SNF. Awaiting PT/OT eval.     ROS: denies fever, chills, cp, sob, n/v, HA unless stated above.      vitamin D  50,000 Units Oral Weekly    sodium chloride flush  5-40 mL Intravenous 2 times per day    enoxaparin  40 mg Subcutaneous Daily    thiamine mononitrate  100 mg Oral Daily    multivitamin  1 tablet Oral Daily     traMADol, 50 mg, Q6H PRN  sodium chloride flush, 5-40 mL, PRN  sodium chloride, 25 mL, PRN  ondansetron, 4 mg, Q8H PRN   Or  ondansetron, 4 mg, Q6H PRN  polyethylene glycol, 17 g, Daily PRN  acetaminophen, 650 mg, Q6H PRN   Or  acetaminophen, 650 mg, Q6H PRN         Objective:    BP (!) 142/84   Pulse 92   Temp 98.3 °F (36.8 °C) (Oral)   Resp 18   Ht 5' 5\" (1.651 m)   Wt 94 lb 8 oz (42.9 kg)   SpO2 99%   BMI 15.73 kg/m²     General Appearance: alert and oriented to person, place, and in no acute distress  Skin: warm and dry, ecchymosis to right arm and shoulder  Head: normocephalic and atraumatic  Eyes: pupils equal, round, and reactive to light, conjunctivae normal  Neck: neck supple and non tender without mass   Pulmonary/Chest: clear to auscultation bilaterally- no wheezes, rales or rhonchi, normal air movement, no respiratory distress  Cardiovascular: normal rate, normal S1 and S2   Abdomen: soft, non-tender, non-distended, normal bowel sounds, no masses or organomegaly  Extremities: no cyanosis, no clubbing, right shoulder deformity in sling with ecchymosis  Neurologic: no cranial nerve deficit and speech normal      Recent Labs     08/07/21  1058 08/08/21  0541    134   K 4.3 3.9    105   CO2 25 18*   BUN 22 15   CREATININE 0.7 0.5   GLUCOSE 113* 75   CALCIUM 10.3* 9.5       Recent Labs     08/07/21  1037 08/08/21  0541   ALKPHOS 99 93   PROT 6.9 6.2*   LABALBU 4.1 3.6   BILITOT 0.7 0.8   AST 17 19   ALT 11 11       Recent Labs     08/07/21  1058 08/08/21  0541   WBC 7.6 7.5   RBC 4.51 4.09   HGB 15.8* 14.1   HCT 45.0 42.0   MCV 99.8 102.7*   MCH 35.0 34.5   MCHC 35.1* 33.6   RDW 14.5 14.3    339   MPV 8.7 8.9           Radiology:   XR CHEST 1 VIEW   Final Result   No acute pulmonary process. No pneumothorax. Fracture identified in the right proximal humerus. Please referred to   dedicated humerus radiograph reports for further detail. XR SHOULDER RIGHT (MIN 2 VIEWS)   Final Result   Common fracture of proximal humerus with approximately 4 cm overlap. CT ABDOMEN PELVIS W WO CONTRAST Additional Contrast? None    (Results Pending)       Assessment:  Principal Problem:    Adult failure to thrive  Active Problems:    Fracture closed, humerus    Urinary and bowel incontinence  Resolved Problems:    * No resolved hospital problems. *      Plan:  1. Adult failure to thrive:  Patient has not been eating or drinking well since 7/24/21. Dietician on consult. Supplements ordered until evaluation. 2. Closed displaced fracture of proximal end of right humerus:  Fracture occurred on 7/24/21. Orthopedic Surgery following. Non-weightbearing to right upper extremity, sling for comfort. Conservative treatment. 3. Incontinence: Urinalysis not indicative of UTI. CT of the abdomen and pelvis pending. 4. Hyperglycemia:  Blood sugar is elevated. Check Hgb A1C.   5. Alcohol use:  Patient drinks two shots of vodka daily, but has not had any alcohol since 7/24/21. CIWA scale if needed.   Continue thiamine and multivitamin. 6.  Metabolic acidosis:  3.02 NS with 75 meq of bicarb. Recheck BMP at 14:00.   7. Tobacco dependence: Smoking cessation education. Nicotine patch if needed. 8. Vitamin D deficiency:  Vitamin D level is 7. Supplementation ordered. NOTE: This report was transcribed using voice recognition software. Every effort was made to ensure accuracy; however, inadvertent computerized transcription errors may be present.      Electronically signed by JANET Begum CNP on 8/8/2021 at 9:12 AM

## 2021-08-08 NOTE — CONSULTS
Department of Orthopedic Surgery  Resident Consult Note      Reason for Consult: Right proximal humerus fracture, 2021    HISTORY OF PRESENT ILLNESS:       Patient is a [de-identified] y.o. female who was admitted for failure to thrive. She is known to the orthopedic service for a proximal humerus fracture she sustained approximately 2 weeks ago secondary to mechanical fall. Conservative treatment was selected at that time. She subsequently has deteriorated at home per her son and was brought into the hospital.  Orthopedic surgery was consulted due to the history of injury. Patient denies any additional trauma since her original injury. Patient has no numbness or tingling in the hand. She is still having pain to the right proximal humerus. She has no new complaints. Patient somewhat confused at this time.       Past Medical History:        Diagnosis Date    Fracture closed, humerus      Past Surgical History:        Procedure Laterality Date     SECTION       Current Medications:   Current Facility-Administered Medications: traMADol (ULTRAM) tablet 50 mg, 50 mg, Oral, Q6H PRN  sodium chloride flush 0.9 % injection 5-40 mL, 5-40 mL, Intravenous, 2 times per day  sodium chloride flush 0.9 % injection 5-40 mL, 5-40 mL, Intravenous, PRN  0.9 % sodium chloride infusion, 25 mL, Intravenous, PRN  enoxaparin (LOVENOX) injection 40 mg, 40 mg, Subcutaneous, Daily  ondansetron (ZOFRAN-ODT) disintegrating tablet 4 mg, 4 mg, Oral, Q8H PRN **OR** ondansetron (ZOFRAN) injection 4 mg, 4 mg, Intravenous, Q6H PRN  polyethylene glycol (GLYCOLAX) packet 17 g, 17 g, Oral, Daily PRN  acetaminophen (TYLENOL) tablet 650 mg, 650 mg, Oral, Q6H PRN **OR** acetaminophen (TYLENOL) suppository 650 mg, 650 mg, Rectal, Q6H PRN  0.9 % sodium chloride infusion, , Intravenous, Continuous  thiamine mononitrate tablet 100 mg, 100 mg, Oral, Daily  therapeutic multivitamin-minerals 1 tablet, 1 tablet, Oral, Daily  Allergies:  Patient has no known allergies. Social History:   TOBACCO:   reports that she has been smoking cigarettes. She has a 8.75 pack-year smoking history. She has never used smokeless tobacco.  ETOH:   reports current alcohol use. DRUGS:   reports no history of drug use. ACTIVITIES OF DAILY LIVING:    OCCUPATION:    Family History:   History reviewed. No pertinent family history. REVIEW OF SYSTEMS:  CONSTITUTIONAL:  negative for  fevers, chills  EYES:  negative for blurred vision, visual disturbance  HEENT:  negative for  hearing loss, voice change  RESPIRATORY:  negative for  dyspnea, wheezing  CARDIOVASCULAR:  negative for  chest pain, palpitations  GASTROINTESTINAL:  negative for nausea, vomiting  GENITOURINARY:  negative for frequency, urinary incontinence  HEMATOLOGIC/LYMPHATIC:  negative for bleeding and petechiae  MUSCULOSKELETAL: See HPI  NEUROLOGICAL:  negative for headaches, dizziness  BEHAVIOR/PSYCH:  negative for increased agitation and anxiety    PHYSICAL EXAM:    VITALS:  BP (!) 159/95   Pulse 96   Temp 98.6 °F (37 °C) (Oral)   Resp 16   Ht 5' 5\" (1.651 m)   Wt 94 lb 8 oz (42.9 kg)   SpO2 100%   BMI 15.73 kg/m²   CONSTITUTIONAL: Awake, alert, oriented to person, disoriented to place and time  MUSCULOSKELETAL:  Right upper Extremity:  · Skin intact comfortably  · Patient placed in sling at this time  · Tenderness to palpation of the proximal humerus  · Motor function intact AIN, PIN, median, radial, and ulnar nerve distributions  · Sensation intact to median, radial, and ulnar nerve distributions  · Radial pulse +2/4  · No tenderness to palpation of the wrist, hand, or elbow  · Compartments soft and compressible    Secondary Exam:   · leftUE: No obvious signs of trauma. -TTP to fingers, hand, wrist, forearm, elbow, humerus, shoulder or clavicle. -- Patient able to flex/extend fingers, wrist, elbow and shoulder with active and passive ROM without pain, +2/4 Radial pulse, cap refill <3sec, +AIN/PIN/Radial/Ulnar/Median N, distal sensation grossly intact to C4-T1 dermatomes, compartments soft and compressible. · bilateralLE: No obvious signs of trauma. -TTP to foot, ankle, leg, knee, thigh, hip.-- Patient able to flex/extend toes, ankle, knee and hip with active and passive ROM without pain,+2/4 DP & PT pulses, cap refill <3sec, +5/5 PF/DF/EHL, distal sensation grossly intact to L4-S1 dermatomes, compartments soft and compressible. · Pelvis: -TTP, -Log roll, -Heel strike     DATA:    CBC:   Lab Results   Component Value Date    WBC 7.6 08/07/2021    RBC 4.51 08/07/2021    HGB 15.8 08/07/2021    HCT 45.0 08/07/2021    MCV 99.8 08/07/2021    MCH 35.0 08/07/2021    MCHC 35.1 08/07/2021    RDW 14.5 08/07/2021     08/07/2021    MPV 8.7 08/07/2021     PT/INR:  No results found for: PROTIME, INR    Radiology Review:  X-ray imaging of the right shoulder reviewed. Right proximal humerus fracture is again demonstrated. There is significant comminution. Overall alignment is somewhat improved however she has considerable shortening which has remained. Glenohumeral joint well located.     IMPRESSION:  · History of right, closed, proximal humerus fracture    PLAN:  · Nonweightbearing to right upper extremity, sling for comfort  · Ice the affected area as needed for pain  · Pain medication per admitting service  · DVT prophylaxis per admitting service  · PT/OT  · Continued conservative treatment, patient to follow-up with Dr. Hood Zeng in clinic for further evaluation and management  · Discuss with attending

## 2021-08-09 VITALS
TEMPERATURE: 98.3 F | OXYGEN SATURATION: 97 % | WEIGHT: 94.5 LBS | HEART RATE: 81 BPM | BODY MASS INDEX: 15.74 KG/M2 | RESPIRATION RATE: 16 BRPM | DIASTOLIC BLOOD PRESSURE: 65 MMHG | HEIGHT: 65 IN | SYSTOLIC BLOOD PRESSURE: 124 MMHG

## 2021-08-09 LAB
ANION GAP SERPL CALCULATED.3IONS-SCNC: 9 MMOL/L (ref 7–16)
BUN BLDV-MCNC: 6 MG/DL (ref 6–23)
CALCIUM SERPL-MCNC: 9.2 MG/DL (ref 8.6–10.2)
CHLORIDE BLD-SCNC: 106 MMOL/L (ref 98–107)
CO2: 23 MMOL/L (ref 22–29)
CREAT SERPL-MCNC: 0.5 MG/DL (ref 0.5–1)
GFR AFRICAN AMERICAN: >60
GFR NON-AFRICAN AMERICAN: >60 ML/MIN/1.73
GLUCOSE BLD-MCNC: 77 MG/DL (ref 74–99)
HCT VFR BLD CALC: 41.2 % (ref 34–48)
HEMOGLOBIN: 14.2 G/DL (ref 11.5–15.5)
MAGNESIUM: 2.1 MG/DL (ref 1.6–2.6)
MCH RBC QN AUTO: 34.7 PG (ref 26–35)
MCHC RBC AUTO-ENTMCNC: 34.5 % (ref 32–34.5)
MCV RBC AUTO: 100.7 FL (ref 80–99.9)
PDW BLD-RTO: 13.9 FL (ref 11.5–15)
PHOSPHORUS: 3.3 MG/DL (ref 2.5–4.5)
PLATELET # BLD: 348 E9/L (ref 130–450)
PMV BLD AUTO: 9.1 FL (ref 7–12)
POTASSIUM SERPL-SCNC: 3.5 MMOL/L (ref 3.5–5)
RBC # BLD: 4.09 E12/L (ref 3.5–5.5)
SODIUM BLD-SCNC: 138 MMOL/L (ref 132–146)
WBC # BLD: 6.4 E9/L (ref 4.5–11.5)

## 2021-08-09 PROCEDURE — 6370000000 HC RX 637 (ALT 250 FOR IP): Performed by: NURSE PRACTITIONER

## 2021-08-09 PROCEDURE — 97530 THERAPEUTIC ACTIVITIES: CPT

## 2021-08-09 PROCEDURE — 99239 HOSP IP/OBS DSCHRG MGMT >30: CPT | Performed by: INTERNAL MEDICINE

## 2021-08-09 PROCEDURE — 97165 OT EVAL LOW COMPLEX 30 MIN: CPT

## 2021-08-09 PROCEDURE — 83735 ASSAY OF MAGNESIUM: CPT

## 2021-08-09 PROCEDURE — 2580000003 HC RX 258: Performed by: NURSE PRACTITIONER

## 2021-08-09 PROCEDURE — APPSS30 APP SPLIT SHARED TIME 16-30 MINUTES: Performed by: NURSE PRACTITIONER

## 2021-08-09 PROCEDURE — 80048 BASIC METABOLIC PNL TOTAL CA: CPT

## 2021-08-09 PROCEDURE — 6360000002 HC RX W HCPCS: Performed by: NURSE PRACTITIONER

## 2021-08-09 PROCEDURE — 36415 COLL VENOUS BLD VENIPUNCTURE: CPT

## 2021-08-09 PROCEDURE — 99221 1ST HOSP IP/OBS SF/LOW 40: CPT | Performed by: SURGERY

## 2021-08-09 PROCEDURE — 84100 ASSAY OF PHOSPHORUS: CPT

## 2021-08-09 PROCEDURE — 85027 COMPLETE CBC AUTOMATED: CPT

## 2021-08-09 RX ORDER — ERGOCALCIFEROL 1.25 MG/1
50000 CAPSULE ORAL WEEKLY
Qty: 5 CAPSULE | DISCHARGE
Start: 2021-08-15

## 2021-08-09 RX ORDER — SODIUM CHLORIDE 450 MG/100ML
INJECTION, SOLUTION INTRAVENOUS CONTINUOUS
Status: DISCONTINUED | OUTPATIENT
Start: 2021-08-09 | End: 2021-08-09 | Stop reason: HOSPADM

## 2021-08-09 RX ORDER — TRAMADOL HYDROCHLORIDE 50 MG/1
50 TABLET ORAL EVERY 6 HOURS PRN
Qty: 12 TABLET | Refills: 0 | Status: SHIPPED | OUTPATIENT
Start: 2021-08-09 | End: 2021-08-12

## 2021-08-09 RX ORDER — THIAMINE MONONITRATE (VIT B1) 100 MG
100 TABLET ORAL DAILY
Refills: 0 | DISCHARGE
Start: 2021-08-10

## 2021-08-09 RX ORDER — M-VIT,TX,IRON,MINS/CALC/FOLIC 27MG-0.4MG
1 TABLET ORAL DAILY
DISCHARGE
Start: 2021-08-10

## 2021-08-09 RX ADMIN — ENOXAPARIN SODIUM 40 MG: 40 INJECTION SUBCUTANEOUS at 15:49

## 2021-08-09 RX ADMIN — TRAMADOL HYDROCHLORIDE 50 MG: 50 TABLET, FILM COATED ORAL at 08:49

## 2021-08-09 RX ADMIN — Medication 100 MG: at 08:46

## 2021-08-09 RX ADMIN — Medication 100 MG: at 08:47

## 2021-08-09 RX ADMIN — SODIUM CHLORIDE: 4.5 INJECTION, SOLUTION INTRAVENOUS at 10:00

## 2021-08-09 RX ADMIN — Medication 1 TABLET: at 08:45

## 2021-08-09 RX ADMIN — TRAMADOL HYDROCHLORIDE 50 MG: 50 TABLET, FILM COATED ORAL at 16:56

## 2021-08-09 ASSESSMENT — PAIN SCALES - GENERAL
PAINLEVEL_OUTOF10: 7
PAINLEVEL_OUTOF10: 8

## 2021-08-09 NOTE — CARE COORDINATION
8/9/2021: SS Note/Discharge plan:  Jossy Michel admissions liaison for [facility] confirming pt's transfer for [facility] at 6:00pm, pt's son, Celine Adan and nursing informed of transfer arrangements. Electronically signed by HALEY Cheatham on 8/9/2021 at 11:54 AM

## 2021-08-09 NOTE — DISCHARGE SUMMARY
[facility] Physician Discharge Summary       [facility] [address] [facility] [address]  [address], [address], . [place] Antonio [place] [address]  [facility], [address]  [address]  [address]  [phone]      Follow up for gallstones      Activity level: Non-weight bearing right upper extremity    Diet: ADULT DIET; Regular    Labs:CBC and BMP twice a week    Condition at discharge: Stable    Dispo:Discharge to SNF        Patient ID:  Sunny Weaver  [id number]  89 y.o.  1940    Admit date: 8/7/2021    Discharge date and time:  8/9/2021  2:18 PM    Admission Diagnoses: Principal Problem:    Adult failure to thrive  Active Problems:    Fracture closed, humerus    Urinary and bowel incontinence    Failure to thrive in adult  Resolved Problems:    * No resolved hospital problems. *      Discharge Diagnoses: Principal Problem:    Adult failure to thrive  Active Problems:    Fracture closed, humerus    Urinary and bowel incontinence    Failure to thrive in adult  Resolved Problems:    * No resolved hospital problems. *      Consults:  IP CONSULT TO ORTHOPEDIC SURGERY  IP CONSULT TO DIETITIAN  IP CONSULT TO SOCIAL WORK  IP CONSULT TO GENERAL SURGERY    Procedures: None    Hospital Course: Sunny Weaver is an 81year old female with a history of a closed displaced fracture of surgical neck of right humerus, tobacco dependence on 7/24/21 that presented to the Emergency Department with right shoulder pain and failure to thrive. Since her fracture she had not been eating well, not ambulating, and being in the proper positioning for fracture healing. An xray was done in the Emergency Department that showed a common fracture of proximal humerus with approximately 4 cm overlap. Orthopedic was consulted and no surgical intervention was indicated.   Patient is to be non-weight bearing to the right upper extremity, wear a sling, and follow up with Ortho as an outpatient. Her family reported that she was incontinent of urine and stool. A CT of the abdomen and pelvis with attention to the lumbar area was done that showed advanced degenerative changes in the lumbar spine at multiple levels within scoliotic deformity, chronic myomatous changes in the uterus and large size gallstone with additional cluster of small calculi, dilatation of the biliary tree mainly of the extra hepatic biliary tree, common bile duct measures up to 9 mm. General Surgery was consulted. Patient is asymptomatic with normal liver function tests. No intervention planned as an inpatient and patient can follow up with Surgery in the office as needed. She was found to be vitamin D deficient and was started on Vitamin D supplementation. She was evaluated by PT/OT and rehab is recommended. She was agreeable to rehab and will be discharged to [place] [address] today.      Discharge Exam:  Vitals:    08/07/21 1708 08/08/21 0515 08/08/21 1835 08/09/21 0500   BP: (!) 159/95 (!) 142/84 138/76 (!) 155/88   Pulse: 96 92 94 86   Resp: 16 18 16 16   Temp: 98.6 °F (37 °C) 98.3 °F (36.8 °C) 98.5 °F (36.9 °C) 98.1 °F (36.7 °C)   TempSrc: Oral Oral Oral Oral   SpO2: 100% 99% 95% 98%   Weight:       Height:         General Appearance: alert and oriented to person, place, and in no acute distress  Skin: warm and dry, ecchymosis to right arm and shoulder  Head: normocephalic and atraumatic  Eyes: pupils equal, round, and reactive to light, conjunctivae normal  Neck: neck supple and non tender without mass   Pulmonary/Chest: clear to auscultation bilaterally- no wheezes, rales or rhonchi, normal air movement, no respiratory distress  Cardiovascular: normal rate, normal S1 and S2   Abdomen: soft, non-tender, non-distended, normal bowel sounds, no masses or organomegaly  Extremities: no cyanosis, no clubbing, right shoulder deformity in sling with ecchymosis  Neurologic: no cranial nerve deficit and speech normal    I/O last 3 completed shifts: In: 1788 [P.O.:630; I.V.:1063]  Out: 3750 [Urine:3750]  I/O this shift:  In: 240 [P.O.:240]  Out: -       LABS:  Recent Labs     08/08/21  0541 08/08/21  1340 08/09/21  0455    130* 138   K 3.9 5.1* 3.5    103 106   CO2 18* 18* 23   BUN 15 12 6   CREATININE 0.5 0.5 0.5   GLUCOSE 75 157* 77   CALCIUM 9.5 9.3 9.2       Recent Labs     08/07/21  1058 08/08/21  0541 08/09/21  0455   WBC 7.6 7.5 6.4   RBC 4.51 4.09 4.09   HGB 15.8* 14.1 14.2   HCT 45.0 42.0 41.2   MCV 99.8 102.7* 100.7*   MCH 35.0 34.5 34.7   MCHC 35.1* 33.6 34.5   RDW 14.5 14.3 13.9    339 348   MPV 8.7 8.9 9.1       No results for input(s): POCGLU in the last 72 hours. Imaging:  CT ABDOMEN PELVIS W WO CONTRAST Additional Contrast? None    Result Date: 8/8/2021  EXAMINATION: CT OF THE ABDOMEN AND PELVIS WITH AND WITHOUT CONTRAST 8/7/2021 7:27 pm TECHNIQUE: CT of the abdomen and pelvis was performed with and without the administration of intravenous contrast.  Multiplanar reformatted images are provided for review. Dose modulation, iterative reconstruction, and/or weight based adjustment of the mA/kV was utilized to reduce the radiation dose to as low as reasonably achievable. COMPARISON: None. HISTORY: ORDERING SYSTEM PROVIDED HISTORY: attention to lumbar area TECHNOLOGIST PROVIDED HISTORY: Reason for exam:->attention to lumbar area Additional Contrast?->None FINDINGS: There is a transitional vertebra in the lumbosacral junction which is partially fused, the transitional level is L5. There is a left rotational scoliotic curvature for the lower thoracic spine/mid upper lumbar spine. There are asymmetric narrowing of the intervertebral disc towards the right concave side of the curvature at L2-3, L1-2, and more discretely at T12-L1.   There is symmetrical narrowing of the disc space towards the left convex side of the curvature at L3-4, and L4-5. Degenerative changes are more prominent towards the narrowed intervertebral disc which is seen in different levels in both sides of the lumbar spine. Degenerative changes of the facet joints are seen predominant at L2-3, L3-4 towards the left convex side of the curvature and towards of the right-sided curvature at L4-5. Mild to moderate anatomic stenosis of the lumbar spine canal is seen in L3-4 and L4-5 where a broad posterior displacement is also present. Encroachments of the neural foramina are seen more prominent at L3-4 at L4-5 in the left convex side curvature. Sclerotic of vertebral bodies are seen in the levels of a L4 in particular and adjacent to the inferior endplates in L3 and minimally in L1 and L2 secondary to degenerative disc disease. No acute fractures are seen in the lumbar spine. No evidence for acute fracture in the sacral spine, sacral wings or in the pelvic bones. No significant findings observed in the hip joints. There are upper normal size for the liver which has normal contrast enhancement. The spleen appear unremarkable. There is some mild atrophy of the pancreas. Pancreatic duct is not dilated the but is visualized measuring about the 1.5 mm. The gallbladder is distended with multiple gallstones. There is a large size calculus in the gallbladder lumen measuring 4.4 x 2.3 x 2.9 cm. Additional small calculi seen towards the upper body of the gallbladder. There is slight prominence of the intrahepatic biliary tree. The common bile duct is dilated, it measures 9 mm in the head of the pancreas. The can be traced to the area of the head of the pancreas near the papilla with unremarkable appearance. Kidneys have preserved size and cortical enhancement. There is no renal calculus or obstruction. Adrenals not enlarged. Calcified atheromatous changes are seen in the abdominal aorta but there is no aneurysm formation.   There is partial encroachment of the central lumen of the aorta by the calcified plaques causing mild to moderate anatomic stenosis but less than 40%. There is good contrast enhancement for the celiac axis and SMA. The portal venous system is not fully opacified but appears to be patent. There are no acute inflammatory changes the mid mesentery fat planes, free intraperitoneal air, size indication for bowel obstruction. The uterus is enlarged and has a nodular appears compatible with multiple myomas of different the enhancement pattern. The ovaries are not conspicuously identified of separate from adjacent bowel segments. There is a Guerra catheter in the bladder. The bladder is not fully distended. Lower lung bases have unremarkable appearance. 1.  No acute intraperitoneal retroperitoneal process in the abdomen or in the pelvis. 2.  Large size gallstones with additional cluster of small calculi. Dilatation of the biliary tree mainly of the extra hepatic biliary tree, common bile duct measures up to 9 mm it can be traced to the papilla. Findings to be correlated clinically including liver function tests, if liver function tests are abnormal further evaluation with MRCP is recommended. 3.  Advanced degenerative changes in the lumbar spine at multiple levels within scoliotic deformity as commented. 4.  Chronic myomatous changes in the uterus. XR SHOULDER RIGHT (MIN 2 VIEWS)    Result Date: 8/7/2021  EXAMINATION: THREE XRAY VIEWS OF THE RIGHT SHOULDER 8/7/2021 12:03 pm COMPARISON: None. HISTORY: ORDERING SYSTEM PROVIDED HISTORY: right shoulder pain TECHNOLOGIST PROVIDED HISTORY: Reason for exam:->right shoulder pain FINDINGS: Comminuted fracture identified in the right proximal humerus approximately 4 cm overlap. Glenohumeral joint and acromioclavicular joint appears intact. No pneumothorax in the partially visualized lung. Common fracture of proximal humerus with approximately 4 cm overlap.      XR CHEST 1 VIEW    Result Date: 8/7/2021  EXAMINATION: ONE XRAY VIEW OF THE CHEST 8/7/2021 12:03 pm COMPARISON: None. HISTORY: ORDERING SYSTEM PROVIDED HISTORY: possible admission TECHNOLOGIST PROVIDED HISTORY: Reason for exam:->possible admission FINDINGS: Opacity overlying the right mid lung is likely outside the patient's body. The lungs are without acute focal process. There is no effusion or pneumothorax. The cardiomediastinal silhouette is without acute process. Fracture identified in the right proximal humerus. No acute pulmonary process. No pneumothorax. Fracture identified in the right proximal humerus. Please referred to dedicated humerus radiograph reports for further detail. Patient Instructions:   Current Discharge Medication List      START taking these medications    Details   Multiple Vitamins-Minerals (THERAPEUTIC MULTIVITAMIN-MINERALS) tablet Take 1 tablet by mouth daily      thiamine mononitrate (THIAMINE) 100 MG tablet Take 1 tablet by mouth daily  Refills: 0      vitamin D (ERGOCALCIFEROL) 1.25 MG (67839 UT) CAPS capsule Take 1 capsule by mouth once a week  Qty: 5 capsule         CONTINUE these medications which have CHANGED    Details   traMADol (ULTRAM) 50 MG tablet Take 1 tablet by mouth every 6 hours as needed for Pain for up to 3 days. Qty: 12 tablet, Refills: 0    Comments: Reduce doses taken as pain becomes manageable  Associated Diagnoses: Closed displaced fracture of surgical neck of right humerus with delayed healing, unspecified fracture morphology, subsequent encounter               Note that more than 30 minutes was spent in preparing discharge papers, discussing discharge with patient, medication review, etc.    NOTE: This report was transcribed using voice recognition software. Every effort was made to ensure accuracy; however, inadvertent computerized transcription errors may be present.      Signed:  Electronically signed by JANET Solis CNP on 8/9/2021 at 2:18 PM

## 2021-08-09 NOTE — PROGRESS NOTES
[address]  [facility] [place] [place]        Date:2021                                                  Patient Name: Esther Manzo    MRN: [id number]    : 1940    Room: [address] [place], [place] [address]-      Evaluating OT: Daphne Caicedo OTR/L; [id number]     Referring Provider and Specific Provider Orders/Date:      21  OT eval and treat Start: 21, End: 21 154, ONE TIME, Standing Count: 1 Occurrences, R      Garrett Dolan, APRN - CNP     Placement Recommendation: Subacute        Diagnosis:   1. Other closed displaced fracture of proximal end of right humerus with routine healing, subsequent encounter    2. Malnutrition, unspecified type ([address]    3. Incontinence of urine in female         Surgery: no surgical intervention.  Proximal R humerus fx       Pertinent Medical History:       Past Medical History:   Diagnosis Date    Fracture closed, humerus          Past Surgical History:   Procedure Laterality Date     SECTION        Precautions:  Fall Risk, NWB: R UE, sling for comfort d/t proximal R humerus fx      Assessment of current deficits    [x] Functional mobility  [x]ADLs  [x] Strength               [x]Cognition    [x] Functional transfers   [x] IADLs         [x] Safety Awareness   [x]Endurance    [] Fine Coordination              [x] Balance      [] Vision/perception   []Sensation     []Gross Motor Coordination  [x] ROM  [] Delirium                   [] Motor Control     OT PLAN OF CARE   OT POC based on physician orders, patient diagnosis and results of clinical assessment    Frequency/Duration 1-3 days/wk for 2 weeks PRN     Specific OT Treatment Interventions to include:   * Instruction/training on adapted ADL techniques and AE recommendations to increase functional independence within precautions       * Training on energy conservation strategies, correct breathing pattern and techniques to improve independence/tolerance for self-care routine  * Functional transfer/mobility training/DME recommendations for increased independence, safety, and fall prevention  * Patient/Family education to increase follow through with safety techniques and functional independence  * Recommendation of environmental modifications for increased safety with functional transfers/mobility and ADLs  * Splinting/positioning for increased function, prevention of contractures, and improve skin integrity  * Therapeutic exercise to improve motor endurance, ROM, and functional strength for ADLs/functional transfers  * Therapeutic activities to facilitate/challenge dynamic balance, stand tolerance for increased safety and independence with ADLs  * Positioning to improve skin integrity, interaction with environment and functional independence    Recommended Adaptive Equipment: Subacute, it patient refuses, she should have [address] with 24/7 assistance for ADL's and IADL's. Home Living: with family: son (another son lives 2 doors down); single family home, 2 story, 2 steps to enter with rail, bedroom and bathroom on 2nd floor, pt has been sleeping on the sofa on 1st floor. Equipment owned: built in shower chair, grab bars     Prior Level of Function: needs assistance with ADL's and IADL's since fracturing R shoulder, ambulated with no device     Pain Level: 9/10 pain in R shoulder; Nursing notified. Cognition: A&O: 3/4; Follows 1 step directions.  Appears to have short term memory deficits    Memory: fair minus    Sequencing: fair    Problem solving: fair minus    Judgement/safety: fair minus     [facility]   AM-PAC Daily Activity Inpatient   How much help for putting on and taking off regular lower body clothing?: A Lot  How much help for Bathing?: A Lot  How much help for Toileting?: A Lot  How much help for putting on and taking off regular upper body clothing?: A Lot  How much help for taking care of personal Pt would benefit from continued skilled OT to increase safety and independence with completion of ADL/IADL tasks for functional independence and quality of life. Treatment: OT treatment provided this date includes:    Instruction/training on safety and adapted techniques for completion of ADLs    Instruction/training on safe functional mobility/transfer techniques    Instruction/training on energy conservation/work simplification for completion of ADLs    Proper Positioning/Alignment    Adjusted sling to proper fit     Instruction/training in commode transfer    Rehab Potential: Good for established goals. Patient / Family Goal: return home with her son       Patient and/or family were instructed on functional diagnosis, prognosis/goals and OT plan of care. Demonstrated good understanding. Eval Complexity: Low    Time In: 9:00am   Time Out: 9:30am    Total Treatment Time: 10      Min Units   OT Eval Low 97165  X  1    OT Eval Medium 31714      OT Eval High 65236      OT Re-Eval [id number]            ADL/Self Care 55019     Therapeutic Activities 36803  10     Therapeutic Ex 27470       Orthotic Management 47347       Manual 47170     Neuro Re-Ed 90241       Non-Billable Time        Evaluation Time additionally includes thorough review of current medical information, gathering information on past medical history/social history and prior level of function, interpretation of standardized testing/informal observation of tasks, assessment of data and development of plan of care and goals.         Evaluating OT: Mckenzie Martinez OTR/L; [id number]

## 2021-08-09 NOTE — PROGRESS NOTES
Patient seen and examined   Full consult to follow   Asymptomatic cholelithiasis - ok to discharge from my point of view  F/u prn    Dima Jaimes MD

## 2021-08-09 NOTE — DISCHARGE INSTR - COC
Continuity of Care Form    Patient Name: Sam Avila   :  1940  MRN:  [id number]    Admit date:  2021  Discharge date:  2021    Code Status Order: Full Code   Advance Directives:      Admitting Physician:  Reece Vargas MD  PCP: Edward Rankin MD    Discharging Nurse: [facility] Unit/Room#: 8933/1535-93  Discharging Unit Phone Number: [phone]    Emergency Contact:   Extended Emergency Contact Information  Primary Emergency Contact: Dixon Pack (dpoa)  Home Phone: [phone]  Mobile Phone: [phone]  Relation: Child  Secondary Emergency Contact: Ehsan Camara  Address: [address] [place] [address] Phone: [phone]  Mobile Phone: [phone]  Relation: Child    Past Surgical History:  Past Surgical History:   Procedure Laterality Date   [facility]       Immunization History:   Immunization History   Administered Date(s) Administered   ADA Lopez, 100mcg/0.5mL 2021, 2021       Active Problems:  Patient Active Problem List   Diagnosis Code    Adult failure to thrive R62.7    Fracture closed, humerus S42.309A    Urinary and bowel incontinence R32, R15.9    Failure to thrive in adult R62.7       Isolation/Infection:   Isolation            No Isolation          Patient Infection Status       Infection Onset Added Last Indicated Last Indicated By Review Planned Expiration Resolved Resolved By    None active    Resolved    COVID-19 Rule Out 21 COVID-19, Rapid (Ordered)   21 Rule-Out Test Resulted            Nurse Assessment:  Last Vital Signs: BP (!) 155/88   Pulse 86   Temp 98.1 °F (36.7 °C) (Oral)   Resp 16   Ht 5' 5\" (1.651 m)   Wt 94 lb 8 oz (42.9 kg)   SpO2 98%   BMI 15.73 kg/m²     Last documented pain score (0-10 scale): Pain Level: 7  Last Weight:   Wt Readings from Last 1 Encounters:   21 94 lb 8 oz (42.9 kg)     Mental Status: disoriented    IV Access:  - None    Nursing Mobility/ADLs:  Walking   Assisted  Transfer  Assisted  Bathing  Assisted  Dressing  Assisted  Toileting  Assisted  Feeding  Assisted  Med Admin  Assisted  Med Delivery   whole    Wound Care Documentation and Therapy:        Elimination:  Continence:   · Bowel: No  · Bladder: No  Urinary Catheter: None   Colostomy/Ileostomy/Ileal Conduit: No       Date of Last BM:     Intake/Output Summary (Last 24 hours) at 8/9/2021 1019  Last data filed at 8/9/2021 0502  Gross per 24 hour   Intake 1513 ml   Output 3550 ml   Net -2037 ml     I/O last 3 completed shifts: In: 4024 [P.O.:630; I.V.:1063]  Out: 3750 [Urine:3750]    Safety Concerns:     History of Falls (last 30 days) and At Risk for Falls    Impairments/Disabilities:      None    Nutrition Therapy:  Current Nutrition Therapy:   - Oral Diet:  General    Routes of Feeding: Oral  Liquids: No Restrictions  Daily Fluid Restriction: no  Last Modified Barium Swallow with Video (Video Swallowing Test): not done    Treatments at the Time of Hospital Discharge:   Respiratory Treatments: ***  Oxygen Therapy:  is not on home oxygen therapy.   Ventilator:    - No ventilator support    Rehab Therapies: Physical Therapy and Occupational Therapy  Weight Bearing Status/Restrictions: No weight bearing restirctions  Other Medical Equipment (for information only, NOT a DME order):  walker  Other Treatments: ***    Patient's personal belongings (please select all that are sent with patient):  {[facility] DME Belongings:[id number]}    RN SIGNATURE:  Electronically signed by Moriah Ramon RN on 8/9/21 at 2:02 PM EDT    CASE MANAGEMENT/SOCIAL WORK SECTION    Inpatient Status Date: 08/08/2021    Readmission Risk Assessment Score:  Readmission Risk              Risk of Unplanned Readmission:  10           Discharging to Facility/ Agency   · Name: [facility]  · Address:  · Phone: [phone]  · Fax: [phone]    Dialysis Facility (if applicable) · Name:  · Address:  · Dialysis Schedule:  · Phone:  · Fax:    / signature: Electronically signed by HALEY Perez on 8/9/21 at 10:19 AM EDT    PHYSICIAN SECTION    Prognosis: Good    Condition at Discharge: Stable    Rehab Potential (if transferring to Rehab): Good    Recommended Labs or Other Treatments After Discharge: ***    Physician Certification: I certify the above information and transfer of Jenae Izaguirre  is necessary for the continuing treatment of the diagnosis listed and that she requires [place] for less 30 days.      Update Admission H&P: {[facility] DME Changes in NTAUA:[id number]}    PHYSICIAN SIGNATURE:  Electronically signed by Lamar Gold MD on 8/9/21 at 12:44 PM EDT

## 2021-08-09 NOTE — PROGRESS NOTES
Physical Therapy    Physical Therapy Treatment Note/Plan of Care    Room #:  3146/9950-78  Patient Name: Gustabo Calles  YOB: 1940  MRN: [id number]    Date of Service: 8/9/2021      Tentative placement recommendation: Subacute rehab    Equipment recommendation: To be determined      Evaluating Physical Therapist: Josh Grossman, PT  #[id number]      Specific Provider Orders/Date/Referring Provider :  08/07/21 1545   PT evaluation and treat Start: 08/07/21 1545, End: 08/07/21 1545, ONE TIME, Standing Count: 1 Occurrences, R    Brenna Crease, APRN - CNP      Admitting Diagnosis:   Adult failure to thrive [R62.7]  Incontinence of urine in female [R32]  Other closed displaced fracture of proximal end of right humerus with routine healing, subsequent encounter [S42.291D]  Malnutrition, unspecified type ([address] [E46]  Failure to thrive in adult [R62.7] **  *previous proximal humerus fracture on 7/24/2021      Visit Diagnoses       Codes    Other closed displaced fracture of proximal end of right humerus with routine healing, subsequent encounter    -  Primary S42.291D    Malnutrition, unspecified type ([address]     E46    Incontinence of urine in female     R32             Patient Active Problem List   Diagnosis    Adult failure to thrive    Fracture closed, humerus    Urinary and bowel incontinence    Failure to thrive in adult        ASSESSMENT of Current Deficits Patient exhibits decreased strength, balance, endurance and pain right shouder impairing functional mobility, transfers, gait , tolerance to activity and participation are barriers to d/c and require skilled intervention during hospital stay to attain pre hospital level of function. Pain and tolerance to upright impact participation and decreased strength and upright control increases patient's risk for fall.  Patient able to maintain NWB with R upper extremity, however requires inc cues for sequencing and assist for upright as patient is fearful of falling and pain is limiting patient in function. PHYSICAL THERAPY  PLAN OF CARE       Physical therapy plan of care is established based on physician order,  patient diagnosis and clinical assessment    Current Treatment Recommendations:    -Bed Mobility: Lower extremity exercises  and Trunk control activities   -Sitting Balance: Incorporate reaching activities to activate trunk muscles  and Facilitate postural control in all planes   -Standing Balance: Perform strengthening exercises in standing to promote motor control with or without upper extremity support  and Instruct patient on adequate base of support to maintain balance  -Transfers: Provide instruction on proper hand and foot position for adequate transfer of weight onto lower extremities and use of gait device, Cues for hand placement, technique and safety and Provide stabilization to prevent fall   -Gait: Gait training, Standing activities to improve: base of support, weight shift, weight bearing  and Exercises to improve hip and knee control   -Endurance: Utilize Supervised activities to increase level of endurance to allow for safe functional mobility including transfers and gait     PT long term treatment goals are located in below grid    Patient and or family understand(s) diagnosis, prognosis, and plan of care. Frequency of treatments: Patient will be seen  daily.          Prior Level of Function: Patient ambulated independently    Rehab Potential: good    for baseline    Past medical history:   Past Medical History:   Diagnosis Date    Fracture closed, humerus      Past Surgical History:   Procedure Laterality Date     SECTION         SUBJECTIVE:    Precautions: Up as tolerated, falls and alarm , sling, non weight bearing right humerus communuted  Social history: Patient lives with son   in a two story home bedroom and bathroom 2nd floor resided on couch post fall  with 2 steps  to enter with Rail and full flight to second with rail   Walk in shower grab bars and step in tub    Equipment owned: unknown,       [id number] [address]  Mobility Inpatient   How much difficulty turning over in bed?: A Lot  How much difficulty sitting down on / standing up from a chair with arms?: A Little  How much difficulty moving from lying on back to sitting on side of bed?: A Lot  How much help from another person moving to and from a bed to a chair?: A Little  How much help from another person needed to walk in hospital room?: A Lot  How much help from another person for climbing 3-5 steps with a railing?: Total  AM-PAC Inpatient Mobility Raw Score : 13  AM-PAC Inpatient T-Scale Score : 36.74  Mobility Inpatient CMS 0-100% Score: 64.91  Mobility Inpatient CMS G-Code Modifier : CL    Nursing cleared patient for PT treatment. Patient sitting in bedside chair at start of session. OBJECTIVE;   Initial Evaluation  Date: 8/8/2021 Treatment Date:   8/9/2021    Short Term/ Long Term   Goals   Was pt agreeable to Eval/treatment? Yes   yes To be met in 3 days   Pain level   10/10  right shoulder 9/10 R shoulder    Bed Mobility    Rolling: Maximal assist of 1    Supine to sit: Maximal assist of 1    Sit to supine: Maximal assist of 1    Scooting: Maximal assist of 1   Rolling: Not assessed    Supine to sit: Not assessed    Sit to supine: Moderate assist of 1   Scooting: Moderate assist of 1    Rolling: Moderate assist of 1    Supine to sit: Moderate assist of 1    Sit to supine: Moderate assist of 1    Scooting: Moderate assist of 1     Transfers Sit to stand: Moderate assist of 1   Sit to stand:  Moderate assist of 1      Sit to stand: Minimal assist of 1     Ambulation    4 side steps using  hand held assist with Moderate assist of 1   for upright 3-4 steps using  hand held assist with Moderate assist of 1   cues for sequencing, NWB (non-weight bearing) weight bearing right upper extremity, safety and proper hand placement    50 feet using

## 2021-08-09 NOTE — CONSULTS
GENERAL SURGERY  CONSULT NOTE  2021    Physician Consulted: Dr. May   Reason for Consult: cholelithiasis       HPI  Court Salomon is a [de-identified] y.o. female who presents for evaluation of cholelithiasis. Patient has significant past medical history of dementia and recent fall leading to proximal humerus fracture. Patient originally presented on  with shoulder pain and failure to thrive. At that time per son patient has not been eating, bathing and has had bowel/urine incontinence. Patient does have baseline dementia and has significant past EtOH abuse history. On exam patient does not have any abdominal pain or tenderness right upper quadrant. Looking through labs patient does not have any leukocytosis or elevation in LFTs. CT reviewed showing cholelithiasis without any significant inflammation or free fluid around the gallbladder. Past Medical History:   Diagnosis Date    Fracture closed, humerus        Past Surgical History:   Procedure Laterality Date     SECTION         Medications Prior to Admission:    Prior to Admission medications    Medication Sig Start Date End Date Taking? Authorizing Provider   traMADol (ULTRAM) 50 MG tablet Take 1 tablet by mouth every 6 hours as needed for Pain for up to 3 days. 21 Yes JANET Zamudio CNP   Multiple Vitamins-Minerals (THERAPEUTIC MULTIVITAMIN-MINERALS) tablet Take 1 tablet by mouth daily 8/10/21  Yes JANET Zamudio CNP   thiamine mononitrate (THIAMINE) 100 MG tablet Take 1 tablet by mouth daily 8/10/21  Yes JANET Zamudio CNP   vitamin D (ERGOCALCIFEROL) 1.25 MG (18216 UT) CAPS capsule Take 1 capsule by mouth once a week 8/15/21  Yes JANET Zamudio CNP       No Known Allergies    History reviewed. No pertinent family history.     Social History     Tobacco Use    Smoking status: Current Some Day Smoker     Packs/day: 0.25     Years: 35.00     Pack years: 8.75     Types: Cigarettes    Smokeless the lumbosacral junction which is partially fused, the transitional level is L5. There is a left rotational scoliotic curvature for the lower thoracic spine/mid upper lumbar spine. There are asymmetric narrowing of the intervertebral disc towards the right concave side of the curvature at L2-3, L1-2, and more discretely at T12-L1. There is symmetrical narrowing of the disc space towards the left convex side of the curvature at L3-4, and L4-5. Degenerative changes are more prominent towards the narrowed intervertebral disc which is seen in different levels in both sides of the lumbar spine. Degenerative changes of the facet joints are seen predominant at L2-3, L3-4 towards the left convex side of the curvature and towards of the right-sided curvature at L4-5. Mild to moderate anatomic stenosis of the lumbar spine canal is seen in L3-4 and L4-5 where a broad posterior displacement is also present. Encroachments of the neural foramina are seen more prominent at L3-4 at L4-5 in the left convex side curvature. Sclerotic of vertebral bodies are seen in the levels of a L4 in particular and adjacent to the inferior endplates in L3 and minimally in L1 and L2 secondary to degenerative disc disease. No acute fractures are seen in the lumbar spine. No evidence for acute fracture in the sacral spine, sacral wings or in the pelvic bones. No significant findings observed in the hip joints. There are upper normal size for the liver which has normal contrast enhancement. The spleen appear unremarkable. There is some mild atrophy of the pancreas. Pancreatic duct is not dilated the but is visualized measuring about the 1.5 mm. The gallbladder is distended with multiple gallstones. There is a large size calculus in the gallbladder lumen measuring 4.4 x 2.3 x 2.9 cm. Additional small calculi seen towards the upper body of the gallbladder. There is slight prominence of the intrahepatic biliary tree.   The common bile duct is dilated, it measures 9 mm in the head of the pancreas. The can be traced to the area of the head of the pancreas near the papilla with unremarkable appearance. Kidneys have preserved size and cortical enhancement. There is no renal calculus or obstruction. Adrenals not enlarged. Calcified atheromatous changes are seen in the abdominal aorta but there is no aneurysm formation. There is partial encroachment of the central lumen of the aorta by the calcified plaques causing mild to moderate anatomic stenosis but less than 40%. There is good contrast enhancement for the celiac axis and SMA. The portal venous system is not fully opacified but appears to be patent. There are no acute inflammatory changes the mid mesentery fat planes, free intraperitoneal air, size indication for bowel obstruction. The uterus is enlarged and has a nodular appears compatible with multiple myomas of different the enhancement pattern. The ovaries are not conspicuously identified of separate from adjacent bowel segments. There is a Guerra catheter in the bladder. The bladder is not fully distended. Lower lung bases have unremarkable appearance. 1.  No acute intraperitoneal retroperitoneal process in the abdomen or in the pelvis. 2.  Large size gallstones with additional cluster of small calculi. Dilatation of the biliary tree mainly of the extra hepatic biliary tree, common bile duct measures up to 9 mm it can be traced to the papilla. Findings to be correlated clinically including liver function tests, if liver function tests are abnormal further evaluation with MRCP is recommended. 3.  Advanced degenerative changes in the lumbar spine at multiple levels within scoliotic deformity as commented. 4.  Chronic myomatous changes in the uterus. XR SHOULDER RIGHT (MIN 2 VIEWS)    Result Date: 8/7/2021  EXAMINATION: THREE XRAY VIEWS OF THE RIGHT SHOULDER 8/7/2021 12:03 pm COMPARISON: None.  HISTORY: ORDERING SYSTEM PROVIDED HISTORY: right shoulder pain TECHNOLOGIST PROVIDED HISTORY: Reason for exam:->right shoulder pain FINDINGS: Comminuted fracture identified in the right proximal humerus approximately 4 cm overlap. Glenohumeral joint and acromioclavicular joint appears intact. No pneumothorax in the partially visualized lung. Common fracture of proximal humerus with approximately 4 cm overlap. XR CHEST 1 VIEW    Result Date: 8/7/2021  EXAMINATION: ONE XRAY VIEW OF THE CHEST 8/7/2021 12:03 pm COMPARISON: None. HISTORY: ORDERING SYSTEM PROVIDED HISTORY: possible admission TECHNOLOGIST PROVIDED HISTORY: Reason for exam:->possible admission FINDINGS: Opacity overlying the right mid lung is likely outside the patient's body. The lungs are without acute focal process. There is no effusion or pneumothorax. The cardiomediastinal silhouette is without acute process. Fracture identified in the right proximal humerus. No acute pulmonary process. No pneumothorax. Fracture identified in the right proximal humerus. Please referred to dedicated humerus radiograph reports for further detail. ASSESSMENT:  [de-identified] y.o. female with possible dementia, failure to thrive found to have cholelithiasis on CT scan    PLAN:  Asymptomatic cholelithiasis  No abdominal pain or elevation in LFT's  No acute surgical intervention needed at this time  Patient is okay for discharge from surgical POV    Electronically signed by Dmitri King DO on 8/9/21 at 3:21 PM EDT    General Surgery Progress Note  [facility]    Patient's Name/Date of Birth: Jessi Damon / 1940    Date: August 10, 2021     Surgeon: Jeremy Weinberg MD    Chief Complaint: Cholelithiasis    Patient Active Problem List   Diagnosis    Adult failure to thrive    Fracture closed, humerus    Urinary and bowel incontinence    Failure to thrive in adult       Subjective: HPI as above. Denies pain, tolerating diet.     Objective:  /65   Pulse 81   Temp 98.3 °F (36.8 °C) (Oral)   Resp 16   Ht 5' 5\" (1.651 m)   Wt 94 lb 8 oz (42.9 kg)   SpO2 97%   BMI 15.73 kg/m²   Labs:  Recent Labs     08/07/21  1058 08/08/21  0541 08/09/21  0455   WBC 7.6 7.5 6.4   HGB 15.8* 14.1 14.2   HCT 45.0 42.0 41.2     Lab Results   Component Value Date    CREATININE 0.5 08/09/2021    BUN 6 08/09/2021     08/09/2021    K 3.5 08/09/2021     08/09/2021    CO2 23 08/09/2021     No results for input(s): LIPASE, AMYLASE in the last 72 hours.   CBC with Differential:    Lab Results   Component Value Date    WBC 6.4 08/09/2021    RBC 4.09 08/09/2021    HGB 14.2 08/09/2021    HCT 41.2 08/09/2021     08/09/2021    .7 08/09/2021    MCH 34.7 08/09/2021    MCHC 34.5 08/09/2021    RDW 13.9 08/09/2021    LYMPHOPCT 17.1 08/07/2021    MONOPCT 7.9 08/07/2021    BASOPCT 0.8 08/07/2021    MONOSABS 0.60 08/07/2021    LYMPHSABS 1.30 08/07/2021    EOSABS 0.03 08/07/2021    BASOSABS 0.06 08/07/2021     CMP:    Lab Results   Component Value Date     08/09/2021    K 3.5 08/09/2021    K 4.3 08/07/2021     08/09/2021    CO2 23 08/09/2021    BUN 6 08/09/2021    CREATININE 0.5 08/09/2021    GFRAA >60 08/09/2021    LABGLOM >60 08/09/2021    GLUCOSE 77 08/09/2021    PROT 6.2 08/08/2021    LABALBU 3.6 08/08/2021    CALCIUM 9.2 08/09/2021    BILITOT 0.8 08/08/2021    ALKPHOS 93 08/08/2021    AST 19 08/08/2021    ALT 11 08/08/2021       General appearance:  NAD  Head: NCAT, PERRLA, EOMI, red conjunctiva  Neck: supple, no masses  Lungs: CTAB, equal chest rise bilateral  Heart: Reg rate  Abdomen: soft, nondistended, nontender  Skin; no lesions  Gu: no cva tenderness  Extremities: extremities normal, atraumatic, no cyanosis or edema      Assessment/Plan:  Chandler Coombs is a [de-identified] y.o. female asymptomatic cholelithiasis, dilated common bile duct    Okay to discharge from my point of view  Follow-up as needed    Daryle Krabbe, MD  8/10/2021  7:58 AM

## 2021-08-09 NOTE — PLAN OF CARE
Problem: Pain:  Goal: Pain level will decrease  Description: Pain level will decrease  Outcome: Met This Shift  Goal: Control of acute pain  Description: Control of acute pain  Outcome: Met This Shift  Goal: Control of chronic pain  Description: Control of chronic pain  Outcome: Met This Shift     Problem: Falls - Risk of:  Goal: Will remain free from falls  Description: Will remain free from falls  Outcome: Met This Shift  Goal: Absence of physical injury  Description: Absence of physical injury  Outcome: Met This Shift     Problem: Skin Integrity:  Goal: Will show no infection signs and symptoms  Description: Will show no infection signs and symptoms  Outcome: Met This Shift  Goal: Absence of new skin breakdown  Description: Absence of new skin breakdown  Outcome: Met This Shift     Problem: Health Behavior:  Goal: Ability to identify and alter actions that are detrimental to health will improve  Description: Ability to identify and alter actions that are detrimental to health will improve  Outcome: Met This Shift     Problem: Tissue Perfusion:  Goal: Peripheral tissue perfusion will improve  Description: Peripheral tissue perfusion will improve  Outcome: Met This Shift

## 2021-09-08 DIAGNOSIS — S42.211A CLOSED DISPLACED FRACTURE OF SURGICAL NECK OF RIGHT HUMERUS, UNSPECIFIED FRACTURE MORPHOLOGY, INITIAL ENCOUNTER: Primary | ICD-10-CM

## 2022-09-06 ENCOUNTER — OFFICE VISIT (OUTPATIENT)
Dept: PODIATRY | Age: 82
End: 2022-09-06

## 2022-09-06 VITALS — HEIGHT: 66 IN | BODY MASS INDEX: 16.07 KG/M2 | WEIGHT: 100 LBS

## 2022-09-06 DIAGNOSIS — R26.2 DIFFICULTY WALKING: ICD-10-CM

## 2022-09-06 DIAGNOSIS — B35.1 ONYCHOMYCOSIS: Primary | ICD-10-CM

## 2022-09-06 DIAGNOSIS — M79.674 PAIN OF TOE OF RIGHT FOOT: ICD-10-CM

## 2022-09-06 DIAGNOSIS — M79.675 PAIN OF TOE OF LEFT FOOT: ICD-10-CM

## 2022-09-06 DIAGNOSIS — I87.2 VENOUS INSUFFICIENCY (CHRONIC) (PERIPHERAL): ICD-10-CM

## 2022-09-06 DIAGNOSIS — I73.9 PVD (PERIPHERAL VASCULAR DISEASE) (HCC): ICD-10-CM

## 2022-09-06 PROCEDURE — 99203 OFFICE O/P NEW LOW 30 MIN: CPT | Performed by: PODIATRIST

## 2022-09-06 PROCEDURE — 1123F ACP DISCUSS/DSCN MKR DOCD: CPT | Performed by: PODIATRIST

## 2022-09-06 PROCEDURE — 11721 DEBRIDE NAIL 6 OR MORE: CPT | Performed by: PODIATRIST

## 2022-09-06 NOTE — PROGRESS NOTES
Patient is in today for evaluation of nail care to bilateral toenails.  Pcp is Choco Carney MD  Last ov 3/6/22

## 2022-09-07 NOTE — PROGRESS NOTES
22     Kristen Sewell    : 1940 Sex: female   Age: 81 y.o. Patient was referred by: None  Patient's PCP/Provider is:  Clinton Prieto MD    Subjective:    Patient seen today for foot evaluation and mycotic nail care    Chief Complaint   Patient presents with    Nail Problem     Nail care       HPI: Patient is unable to debride her nails appropriately at this time due to arthritic issues present. She does try to wear appropriate shoe gear on a daily basis. ROS:  Const: Positives and pertinent negatives as per HPI. Musculo: Denies symptoms other than stated above. Neuro: Denies symptoms other than stated above. Skin: Denies symptoms other than stated above. Current Medications:    Current Outpatient Medications:     Multiple Vitamins-Minerals (THERAPEUTIC MULTIVITAMIN-MINERALS) tablet, Take 1 tablet by mouth daily, Disp: , Rfl:     thiamine mononitrate (THIAMINE) 100 MG tablet, Take 1 tablet by mouth daily, Disp: , Rfl: 0    vitamin D (ERGOCALCIFEROL) 1.25 MG (86756 UT) CAPS capsule, Take 1 capsule by mouth once a week, Disp: 5 capsule, Rfl:     Allergies:  No Known Allergies    Vitals:    22 1514   Weight: 100 lb (45.4 kg)   Height: 5' 6\" (1.676 m)        Past Medical History:   Diagnosis Date    Fracture closed, humerus      No family history on file. Past Surgical History:   Procedure Laterality Date     SECTION       Social History     Tobacco Use    Smoking status: Some Days     Packs/day: 0.25     Years: 35.00     Pack years: 8.75     Types: Cigarettes    Smokeless tobacco: Never   Vaping Use    Vaping Use: Never used   Substance Use Topics    Alcohol use: Yes     Comment: Occasional     Drug use: Never           Diagnostic studies:    No results found. Procedures:    Debridement of nails 1, 2, 3, 4, 5 right foot and nails 1, 2, 3, 4, 5 left foot was performed with both manual and electrical debridement to prevent infection and/or ulceration.   Patient tolerated the debridement well, without any complaints. Patient had no issues with debridement performed on today's visit. Exam:  VASCULAR: Pulses diminished bilateral foot. CFT delayed digits 1 through 5 bilateral foot. Absence of hair growth noted bilateral foot. NEUROLOGICAL: Epicritic sensations intact and symmetrical  DERMATOLOGICAL: Nails 1 through 5 bilaterally noted to be thickened, dystrophic, with tenderness noted to palpation. Edema noted with varicosities and stasis skin changes present bilaterally  MUSCULOSKELETAL: Mild contraction deformities noted bilateral foot. Plan Per Assessment  Jake Orellnaa was seen today for nail problem. Diagnoses and all orders for this visit:    Onychomycosis    Pain of toe of right foot    Pain of toe of left foot    PVD (peripheral vascular disease) (HCC)    Venous insufficiency (chronic) (peripheral)    Difficulty walking        New patient evaluation and management  Mycotic nail debridement performed as described above to patient tolerance. Discussed additional skin care techniques to prevent potential lower extremity issues from occurring. Will be followed up at a later date for continued evaluation and care. Seen By:    Chaparro Garcia DPM    Electronically signed by Chaparro Garcia DPM on 9/6/2022 at 8:26 PM      This note was created using voice recognition software. The note was reviewed however may contain grammatical errors.

## 2023-01-05 ENCOUNTER — OFFICE VISIT (OUTPATIENT)
Dept: FAMILY MEDICINE CLINIC | Age: 83
End: 2023-01-05

## 2023-01-05 VITALS
HEIGHT: 62 IN | TEMPERATURE: 97 F | WEIGHT: 95 LBS | BODY MASS INDEX: 17.48 KG/M2 | OXYGEN SATURATION: 95 % | SYSTOLIC BLOOD PRESSURE: 102 MMHG | DIASTOLIC BLOOD PRESSURE: 64 MMHG | HEART RATE: 68 BPM

## 2023-01-05 DIAGNOSIS — F17.210 CIGARETTE NICOTINE DEPENDENCE WITHOUT COMPLICATION: ICD-10-CM

## 2023-01-05 DIAGNOSIS — Z76.89 ENCOUNTER TO ESTABLISH CARE: Primary | ICD-10-CM

## 2023-01-05 DIAGNOSIS — F10.20 ALCOHOLISM (HCC): ICD-10-CM

## 2023-01-05 DIAGNOSIS — R41.0 CONFUSION: ICD-10-CM

## 2023-01-05 PROBLEM — F10.10 ALCOHOL ABUSE, UNCOMPLICATED: Status: ACTIVE | Noted: 2021-08-09

## 2023-01-05 PROBLEM — M62.81 MUSCLE WEAKNESS (GENERALIZED): Status: ACTIVE | Noted: 2021-08-09

## 2023-01-05 PROBLEM — R27.8 OTHER LACK OF COORDINATION: Status: ACTIVE | Noted: 2021-08-09

## 2023-01-05 PROBLEM — E43 UNSPECIFIED SEVERE PROTEIN-CALORIE MALNUTRITION (HCC): Status: ACTIVE | Noted: 2021-08-11

## 2023-01-05 PROBLEM — R32 URINARY INCONTINENCE: Status: ACTIVE | Noted: 2021-08-09

## 2023-01-05 PROBLEM — R48.8 OTHER SYMBOLIC DYSFUNCTIONS: Status: ACTIVE | Noted: 2021-08-09

## 2023-01-05 SDOH — ECONOMIC STABILITY: FOOD INSECURITY: WITHIN THE PAST 12 MONTHS, YOU WORRIED THAT YOUR FOOD WOULD RUN OUT BEFORE YOU GOT MONEY TO BUY MORE.: NEVER TRUE

## 2023-01-05 SDOH — ECONOMIC STABILITY: FOOD INSECURITY: WITHIN THE PAST 12 MONTHS, THE FOOD YOU BOUGHT JUST DIDN'T LAST AND YOU DIDN'T HAVE MONEY TO GET MORE.: NEVER TRUE

## 2023-01-05 ASSESSMENT — SOCIAL DETERMINANTS OF HEALTH (SDOH): HOW HARD IS IT FOR YOU TO PAY FOR THE VERY BASICS LIKE FOOD, HOUSING, MEDICAL CARE, AND HEATING?: NOT HARD AT ALL

## 2023-01-05 ASSESSMENT — ENCOUNTER SYMPTOMS
EYES NEGATIVE: 1
GASTROINTESTINAL NEGATIVE: 1
ALLERGIC/IMMUNOLOGIC NEGATIVE: 1
RESPIRATORY NEGATIVE: 1

## 2023-01-05 ASSESSMENT — PATIENT HEALTH QUESTIONNAIRE - PHQ9
1. LITTLE INTEREST OR PLEASURE IN DOING THINGS: 0
SUM OF ALL RESPONSES TO PHQ QUESTIONS 1-9: 0
2. FEELING DOWN, DEPRESSED OR HOPELESS: 0
SUM OF ALL RESPONSES TO PHQ QUESTIONS 1-9: 0
SUM OF ALL RESPONSES TO PHQ9 QUESTIONS 1 & 2: 0

## 2023-01-05 NOTE — PROGRESS NOTES
OFFICE PROGRESS NOTE      SUBJECTIVE:        Patient ID:   Jose Rubio is a 81 y.o. female who presents for   Chief Complaint   Patient presents with    Establish Care     Here to establish self as a patient . HPI:   Patient here with his son for to establish  Patient not able to give proper history  According to son she has been getting very confused  She had not seen a doctor in a long time  She still continues to smoke and drink alcohol  She is incontinent of urine  Still gets confused  Son is taking care of her at home          Prior to Visit Medications    Not on File      Social History     Socioeconomic History    Marital status: Single     Spouse name: None    Number of children: 4    Years of education: None    Highest education level: None   Tobacco Use    Smoking status: Some Days     Packs/day: 0.25     Years: 35.00     Pack years: 8.75     Types: Cigarettes    Smokeless tobacco: Never   Vaping Use    Vaping Use: Never used   Substance and Sexual Activity    Alcohol use: Yes     Comment: Occasional     Drug use: Never    Sexual activity: Not Currently     Social Determinants of Health     Financial Resource Strain: Low Risk     Difficulty of Paying Living Expenses: Not hard at all   Food Insecurity: No Food Insecurity    Worried About Running Out of Food in the Last Year: Never true    [address] in the Last Year: Never true       I have reviewed Adrian's allergies, medications, problem list, medical, social and family history and have updated as needed in the electronic medical record  Review Of Systems:    Review of Systems   Constitutional: Negative. HENT: Negative. Eyes: Negative. Respiratory: Negative. Cardiovascular: Negative. Gastrointestinal: Negative. Endocrine: Negative. Musculoskeletal: Negative. Skin: Negative. Allergic/Immunologic: Negative. Neurological: Negative. Hematological: Negative. Psychiatric/Behavioral:  Positive for confusion. OBJECTIVE:     VS:  Wt Readings from Last 3 Encounters:   01/05/23 95 lb (43.1 kg)   09/06/22 100 lb (45.4 kg)   08/07/21 94 lb 8 oz (42.9 kg)     Temp Readings from Last 3 Encounters:   01/05/23 97 °F (36.1 °C) (Infrared)   08/09/21 98.3 °F (36.8 °C) (Oral)   08/02/21 98 °F (36.7 °C)     BP Readings from Last 3 Encounters:   01/05/23 102/64   08/09/21 124/65   07/24/21 139/75        Physical Exam  Constitutional:       Appearance: She is well-developed. HENT:      Head: Normocephalic and atraumatic. Comments: Poor oral hygiene  Eyes:      Conjunctiva/sclera: Conjunctivae normal.      Pupils: Pupils are equal, round, and reactive to light. Cardiovascular:      Rate and Rhythm: Normal rate and regular rhythm. Heart sounds: Normal heart sounds. Pulmonary:      Effort: Pulmonary effort is normal.      Breath sounds: Normal breath sounds. Abdominal:      General: Bowel sounds are normal.      Palpations: Abdomen is soft. Musculoskeletal:         General: Normal range of motion. Cervical back: Normal range of motion and neck supple. Skin:     General: Skin is warm and dry. Neurological:      Mental Status: She is alert and oriented to person, place, and time. Psychiatric:         Thought Content:  Thought content normal.          Labs :    Lab Results   Component Value Date    WBC 6.4 08/09/2021    HGB 14.2 08/09/2021    HCT 41.2 08/09/2021     08/09/2021    ALT 11 08/08/2021    AST 19 08/08/2021     08/09/2021    K 3.5 08/09/2021     08/09/2021    CREATININE 0.5 08/09/2021    BUN 6 08/09/2021    CO2 23 08/09/2021    INR 1.1 08/08/2021    LABA1C 4.4 08/08/2021     Lab Results   Component Value Date/Time    COLORU ENRIQUETA 08/07/2021 10:58 AM    NITRU Negative 08/07/2021 10:58 AM    GLUCOSEU Negative 08/07/2021 10:58 AM    KETUA 15 08/07/2021 10:58 AM    UROBILINOGEN 1.0 08/07/2021 10:58 AM    BILIRUBINUR SMALL 08/07/2021 10:58 AM     No results found for: PSA, CEA, , [id number],       Controlled Substances Monitoring:                                    ASSESSMENT     Patient Active Problem List    Diagnosis Date Noted    Onychomycosis 09/06/2022    Unspecified severe protein-calorie malnutrition ([facility] [address] 08/11/2021    Alcohol abuse, uncomplicated 65/63/1977    Muscle weakness (generalized) 08/09/2021    Other lack of coordination 70/53/2159    Other symbolic dysfunctions 99/45/2381    Urinary incontinence 08/09/2021    Failure to thrive in adult 08/08/2021    Adult failure to thrive 08/07/2021    Fracture closed, humerus         Diagnosis:     ICD-10-CM    1. Encounter to establish care  Z76.89 CBC with Auto Differential     Comprehensive Metabolic Panel     LIPID PANEL     Urinalysis      2. Confusion  R41.0 CBC with Auto Differential     Comprehensive Metabolic Panel      3. Cigarette nicotine dependence without complication  X47.704 XR CHEST PA INSPIRATION 1 VW      4. Alcoholism ([facility] [address]  F10.20 CBC with Auto Differential     Comprehensive Metabolic Panel          PLAN:   Get the lab work done  Quit alcohol and nicotine  Return to clinic after the x-ray and the lab work  Return to clinic earlier if any problems  Follow-up with a dentist  All instruction given to the son          Patient Instructions   Quit smoking and alcohol  Proper diet  Lab work before the next visit  Follow-up with a dentist  Return to clinic earlier if any problem    Return in about 2 weeks (around 1/19/2023) for Medication Check, test results. Kyler Chao reviewed my findings and recommendations with Zaid Manzo.     Electronicallysigned by Rand Youngblood MD on 1/5/23 at 1:38 PM EST

## 2023-01-05 NOTE — PATIENT INSTRUCTIONS
Quit smoking and alcohol  Proper diet  Lab work before the next visit  Follow-up with a dentist  Return to clinic earlier if any problem

## 2023-02-15 DIAGNOSIS — Z76.89 ENCOUNTER TO ESTABLISH CARE: ICD-10-CM

## 2023-02-15 DIAGNOSIS — R41.0 CONFUSION: ICD-10-CM

## 2023-02-15 DIAGNOSIS — F10.20 ALCOHOLISM (HCC): ICD-10-CM

## 2023-02-15 LAB
ALBUMIN SERPL-MCNC: 4.5 G/DL (ref 3.5–5.2)
ALP BLD-CCNC: 69 U/L (ref 35–104)
ALT SERPL-CCNC: 8 U/L (ref 0–32)
ANION GAP SERPL CALCULATED.3IONS-SCNC: 13 MMOL/L (ref 7–16)
AST SERPL-CCNC: 17 U/L (ref 0–31)
BASOPHILS ABSOLUTE: 0.09 E9/L (ref 0–0.2)
BASOPHILS RELATIVE PERCENT: 1.4 % (ref 0–2)
BILIRUB SERPL-MCNC: 1 MG/DL (ref 0–1.2)
BUN BLDV-MCNC: 20 MG/DL (ref 6–23)
CALCIUM SERPL-MCNC: 10.4 MG/DL (ref 8.6–10.2)
CHLORIDE BLD-SCNC: 100 MMOL/L (ref 98–107)
CHOLESTEROL, TOTAL: 216 MG/DL (ref 0–199)
CO2: 22 MMOL/L (ref 22–29)
CREAT SERPL-MCNC: 0.8 MG/DL (ref 0.5–1)
EOSINOPHILS ABSOLUTE: 0.06 E9/L (ref 0.05–0.5)
EOSINOPHILS RELATIVE PERCENT: 0.9 % (ref 0–6)
GFR SERPL CREATININE-BSD FRML MDRD: >60 ML/MIN/1.73
GLUCOSE BLD-MCNC: 78 MG/DL (ref 74–99)
HCT VFR BLD CALC: 48.8 % (ref 34–48)
HDLC SERPL-MCNC: 78 MG/DL
HEMOGLOBIN: 16.3 G/DL (ref 11.5–15.5)
IMMATURE GRANULOCYTES #: 0.01 E9/L
IMMATURE GRANULOCYTES %: 0.2 % (ref 0–5)
LDL CHOLESTEROL CALCULATED: 120 MG/DL (ref 0–99)
LYMPHOCYTES ABSOLUTE: 2.25 E9/L (ref 1.5–4)
LYMPHOCYTES RELATIVE PERCENT: 35.3 % (ref 20–42)
MCH RBC QN AUTO: 33.7 PG (ref 26–35)
MCHC RBC AUTO-ENTMCNC: 33.4 % (ref 32–34.5)
MCV RBC AUTO: 101 FL (ref 80–99.9)
MONOCYTES ABSOLUTE: 0.49 E9/L (ref 0.1–0.95)
MONOCYTES RELATIVE PERCENT: 7.7 % (ref 2–12)
NEUTROPHILS ABSOLUTE: 3.48 E9/L (ref 1.8–7.3)
NEUTROPHILS RELATIVE PERCENT: 54.5 % (ref 43–80)
PDW BLD-RTO: 14.1 FL (ref 11.5–15)
PLATELET # BLD: 237 E9/L (ref 130–450)
PMV BLD AUTO: 10.4 FL (ref 7–12)
POTASSIUM SERPL-SCNC: 5 MMOL/L (ref 3.5–5)
RBC # BLD: 4.83 E12/L (ref 3.5–5.5)
SODIUM BLD-SCNC: 135 MMOL/L (ref 132–146)
TOTAL PROTEIN: 7.2 G/DL (ref 6.4–8.3)
TRIGL SERPL-MCNC: 90 MG/DL (ref 0–149)
VLDLC SERPL CALC-MCNC: 18 MG/DL
WBC # BLD: 6.4 E9/L (ref 4.5–11.5)

## 2023-02-16 ENCOUNTER — OFFICE VISIT (OUTPATIENT)
Dept: FAMILY MEDICINE CLINIC | Age: 83
End: 2023-02-16

## 2023-02-16 VITALS
BODY MASS INDEX: 17.19 KG/M2 | TEMPERATURE: 97 F | WEIGHT: 94 LBS | SYSTOLIC BLOOD PRESSURE: 102 MMHG | OXYGEN SATURATION: 96 % | DIASTOLIC BLOOD PRESSURE: 66 MMHG | HEART RATE: 86 BPM

## 2023-02-16 DIAGNOSIS — E78.5 HYPERLIPIDEMIA, UNSPECIFIED HYPERLIPIDEMIA TYPE: Primary | ICD-10-CM

## 2023-02-16 DIAGNOSIS — F10.20 ALCOHOLISM (HCC): ICD-10-CM

## 2023-02-16 DIAGNOSIS — E46 MALNUTRITION, UNSPECIFIED TYPE (HCC): ICD-10-CM

## 2023-02-16 DIAGNOSIS — F17.210 CIGARETTE NICOTINE DEPENDENCE WITHOUT COMPLICATION: ICD-10-CM

## 2023-02-16 PROCEDURE — 1123F ACP DISCUSS/DSCN MKR DOCD: CPT | Performed by: FAMILY MEDICINE

## 2023-02-16 PROCEDURE — 99212 OFFICE O/P EST SF 10 MIN: CPT | Performed by: FAMILY MEDICINE

## 2023-02-16 SDOH — ECONOMIC STABILITY: INCOME INSECURITY: HOW HARD IS IT FOR YOU TO PAY FOR THE VERY BASICS LIKE FOOD, HOUSING, MEDICAL CARE, AND HEATING?: NOT HARD AT ALL

## 2023-02-16 SDOH — ECONOMIC STABILITY: FOOD INSECURITY: WITHIN THE PAST 12 MONTHS, YOU WORRIED THAT YOUR FOOD WOULD RUN OUT BEFORE YOU GOT MONEY TO BUY MORE.: NEVER TRUE

## 2023-02-16 SDOH — ECONOMIC STABILITY: HOUSING INSECURITY
IN THE LAST 12 MONTHS, WAS THERE A TIME WHEN YOU DID NOT HAVE A STEADY PLACE TO SLEEP OR SLEPT IN A SHELTER (INCLUDING NOW)?: NO

## 2023-02-16 SDOH — ECONOMIC STABILITY: FOOD INSECURITY: WITHIN THE PAST 12 MONTHS, THE FOOD YOU BOUGHT JUST DIDN'T LAST AND YOU DIDN'T HAVE MONEY TO GET MORE.: NEVER TRUE

## 2023-02-16 ASSESSMENT — ENCOUNTER SYMPTOMS
RESPIRATORY NEGATIVE: 1
ALLERGIC/IMMUNOLOGIC NEGATIVE: 1
GASTROINTESTINAL NEGATIVE: 1
EYES NEGATIVE: 1

## 2023-02-16 NOTE — PATIENT INSTRUCTIONS
Low-fat diet  Get the x-ray of the chest  Follow-up with the dentist  Quit smoking and alcohol  Return to clinic earlier if any problems

## 2023-02-16 NOTE — PROGRESS NOTES
OFFICE PROGRESS NOTE      SUBJECTIVE:        Patient ID:   Aguilar Barahona is a 81 y.o. female who presents for   Chief Complaint   Patient presents with    Discuss Labs     Here to discuss lab results. Patient for the follow    HPI:   Patient here with her son  No specific complaint  Lab work showed elevated LDL  Patient still continues to smoke and drink alcohol  Rest of the lab work is normal  Patient has not seen the dentist  Patient has bad dentition  Did not do the x-ray of the chest done      Prior to Visit Medications    Not on File      Social History     Socioeconomic History    Marital status: Single     Spouse name: None    Number of children: 4    Years of education: None    Highest education level: None   Tobacco Use    Smoking status: Some Days     Packs/day: 0.25     Years: 35.00     Pack years: 8.75     Types: Cigarettes    Smokeless tobacco: Never   Vaping Use    Vaping Use: Never used   Substance and Sexual Activity    Alcohol use: Yes     Comment: Occasional     Drug use: Never    Sexual activity: Not Currently     Social Determinants of Health     Financial Resource Strain: Low Risk     Difficulty of Paying Living Expenses: Not hard at all   Food Insecurity: No Food Insecurity    Worried About Running Out of Food in the Last Year: Never true    Ran Out of Food in the Last Year: Never true   Transportation Needs: Unknown    Lack of Transportation (Non-Medical): No   Housing Stability: Unknown    Unstable Housing in the Last Year: No       I have reviewed Adrian's allergies, medications, problem list, medical, social and family history and have updated as needed in the electronic medical record  Review Of Systems:    Review of Systems   Constitutional: Negative. HENT:  Positive for dental problem. Eyes: Negative. Respiratory: Negative. Cardiovascular: Negative. Gastrointestinal: Negative. Endocrine: Negative.     Musculoskeletal: Negative. Skin: Negative. Allergic/Immunologic: Negative. Neurological: Negative. Hematological: Negative. Psychiatric/Behavioral:  Positive for confusion. OBJECTIVE:     VS:  Wt Readings from Last 3 Encounters:   02/16/23 94 lb (42.6 kg)   01/05/23 95 lb (43.1 kg)   09/06/22 100 lb (45.4 kg)     Temp Readings from Last 3 Encounters:   02/16/23 97 °F (36.1 °C)   01/05/23 97 °F (36.1 °C) (Infrared)   08/09/21 98.3 °F (36.8 °C) (Oral)     BP Readings from Last 3 Encounters:   02/16/23 102/66   01/05/23 102/64   08/09/21 124/65        Physical Exam  Constitutional:       Appearance: She is well-developed. HENT:      Head: Normocephalic and atraumatic. Eyes:      Conjunctiva/sclera: Conjunctivae normal.      Pupils: Pupils are equal, round, and reactive to light. Cardiovascular:      Rate and Rhythm: Normal rate and regular rhythm. Heart sounds: Normal heart sounds. Pulmonary:      Effort: Pulmonary effort is normal.      Breath sounds: Normal breath sounds. Abdominal:      General: Bowel sounds are normal.      Palpations: Abdomen is soft. Musculoskeletal:         General: Normal range of motion. Cervical back: Normal range of motion and neck supple. Skin:     General: Skin is warm and dry. Neurological:      Mental Status: She is alert and oriented to person, place, and time. Psychiatric:         Thought Content:  Thought content normal.          Labs :    Lab Results   Component Value Date    WBC 6.4 02/15/2023    HGB 16.3 (H) 02/15/2023    HCT 48.8 (H) 02/15/2023     02/15/2023    CHOL 216 (H) 02/15/2023    TRIG 90 02/15/2023    HDL 78 02/15/2023    ALT 8 02/15/2023    AST 17 02/15/2023     02/15/2023    K 5.0 02/15/2023     02/15/2023    CREATININE 0.8 02/15/2023    BUN 20 02/15/2023    CO2 22 02/15/2023    INR 1.1 08/08/2021    LABA1C 4.4 08/08/2021     Lab Results   Component Value Date/Time    COLORU ENRIQUETA 08/07/2021 10:58 AM    BAKARI Negative 08/07/2021 10:58 AM    GLUCOSEU Negative 08/07/2021 10:58 AM    KETUA 15 08/07/2021 10:58 AM    UROBILINOGEN 1.0 08/07/2021 10:58 AM    BILIRUBINUR SMALL 08/07/2021 10:58 AM     No results found for: PSA, CEA, , [id number],       Controlled Substances Monitoring:                                    ASSESSMENT     Patient Active Problem List    Diagnosis Date Noted    Onychomycosis 09/06/2022    Unspecified severe protein-calorie malnutrition ([facility] [address] 08/11/2021    Alcohol abuse, uncomplicated 37/63/9182    Muscle weakness (generalized) 08/09/2021    Other lack of coordination 72/29/7114    Other symbolic dysfunctions 32/40/4789    Urinary incontinence 08/09/2021    Failure to thrive in adult 08/08/2021    Adult failure to thrive 08/07/2021    Fracture closed, humerus         Diagnosis:   1. Hyperlipidemia, unspecified hyperlipidemia type  2. Malnutrition, unspecified type ([facility]  3. Cigarette nicotine dependence without complication  4. Alcoholism [facility])     PLAN:   Patient and the son instructed to discontinue smoking and alcohol  Get the x-ray of the chest done  Low-fat diet  Return to clinic earlier if any problem  Follow-up with the dentist        Patient Instructions   Low-fat diet  Get the x-ray of the chest  Follow-up with the dentist  Quit smoking and alcohol  Return to clinic earlier if any problems    Return in about 4 weeks (around 3/16/2023) for Medication Check, test results. Jah Larson reviewed my findings and recommendations with Deisy Lin.     Electronicallysigned by Kae Estevez MD on 2/16/23 at 1:31 PM EST

## 2023-03-13 ENCOUNTER — APPOINTMENT (OUTPATIENT)
Dept: GENERAL RADIOLOGY | Age: 83
End: 2023-03-13

## 2023-03-13 ENCOUNTER — APPOINTMENT (OUTPATIENT)
Dept: CT IMAGING | Age: 83
End: 2023-03-13

## 2023-03-13 ENCOUNTER — HOSPITAL ENCOUNTER (EMERGENCY)
Age: 83
Discharge: HOME | End: 2023-03-13
Attending: EMERGENCY MEDICINE

## 2023-03-13 VITALS
SYSTOLIC BLOOD PRESSURE: 141 MMHG | TEMPERATURE: 97.6 F | RESPIRATION RATE: 18 BRPM | OXYGEN SATURATION: 99 % | HEART RATE: 82 BPM | DIASTOLIC BLOOD PRESSURE: 79 MMHG

## 2023-03-13 DIAGNOSIS — M79.605 PAIN IN BOTH LOWER EXTREMITIES: ICD-10-CM

## 2023-03-13 DIAGNOSIS — M79.604 PAIN IN BOTH LOWER EXTREMITIES: ICD-10-CM
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
## 2023-03-13 PROCEDURE — 99284 EMERGENCY DEPT VISIT MOD MDM: CPT

## 2023-03-13 PROCEDURE — 73560 X-RAY EXAM OF KNEE 1 OR 2: CPT

## 2023-03-13 PROCEDURE — 73521 X-RAY EXAM HIPS BI 2 VIEWS: CPT

## 2023-03-13 PROCEDURE — 73610 X-RAY EXAM OF ANKLE: CPT

## 2023-03-13 PROCEDURE — 71045 X-RAY EXAM CHEST 1 VIEW: CPT

## 2023-03-13 PROCEDURE — 70450 CT HEAD/BRAIN W/O DYE: CPT

## 2023-03-13 NOTE — ED PROVIDER NOTES
[address]        Pt Name: Noah Ware  MRN: [id number]  [place] 1940  Date of evaluation: 3/13/2023  Provider: Jennifer Arevalo DO  PCP: Yoly Modi MD  Note Started: 2:47 PM EDT 3/13/23    CHIEF COMPLAINT       Chief Complaint   Patient presents with    Fall     Fall last night per son pt has no complaints at this time denies pain, denies hitting head        HISTORY OF PRESENT ILLNESS: 1 or more Elements   History From: patient and son    Limitations to history : dementia    Noah Ware is a 81 y.o. female who presents with bilateral ankle and leg pain beginning yesterday after mechanical fall. Patient states she was walking to get the dog in the living room yesterday and tripped and fell. Her legs have hurt since then. She denies head injury, LOC, neck or back pain. Her son gave her 2 Tylenol PM prior to arrival and she seems a bit drowsy. Per EMS patient has dementia, but ha lucid days. The complaint has been persistent, moderate in severity, and worsened by movement or weight bearing. Patient denies fever/chills, sore throat, cough, congestion, chest pain, shortness of breath, edema, headache, visual disturbances, focal paresthesias, focal weakness, abdominal pain, nausea, vomiting, diarrhea, constipation, dysuria, hematuria, trauma, neck or back pain, rash or other complaints. Patient advises she takes no medications, has no medical history and has no allergies. Nursing Notes were all reviewed and agreed with or any disagreements were addressed in the HPI. REVIEW OF SYSTEMS :           Positives and Pertinent negatives as per HPI. SURGICAL HISTORY     Past Surgical History:   Procedure Laterality Date     SECTION         CURRENTMEDICATIONS       There are no discharge medications for this patient. ALLERGIES     Patient has no known allergies.     FAMILYHISTORY History reviewed. No pertinent family history. SOCIAL HISTORY       Social History     Tobacco Use    Smoking status: Some Days     Packs/day: 0.25     Years: 35.00     Pack years: 8.75     Types: Cigarettes    Smokeless tobacco: Never   Vaping Use    Vaping Use: Never used   Substance Use Topics    Alcohol use: Yes     Comment: Occasional     Drug use: Never       SCREENINGS        [place] Coma Scale  Eye Opening: Spontaneous  Best Verbal Response: Confused  Best Motor Response: Obeys commands  [place] Coma Scale Score: 14                CIWA Assessment  BP: (!) 141/79  Heart Rate: 82           PHYSICAL EXAM  1 or more Elements     ED Triage Vitals [03/13/23 1446]   BP Temp Temp src Heart Rate Resp SpO2 Height Weight   (!) 141/79 97.6 °F (36.4 °C) -- 82 18 99 % -- --       ----------------------------------------PHYSICAL EXAM--------------------------------------  Constitutional:  Well developed, well nourished, no acute distress, non-toxic appearance   Eyes:  PERRL, conjunctiva normal, EOMI  HENT:  Atraumatic, external ears normal, nose normal, oropharynx moist. Neck- normal range of motion, no nuchal rigidity   Respiratory:  No respiratory distress, normal breath sounds, no rales, no wheezing   Cardiovascular:  Normal rate, normal rhythm, no murmurs, no gallops, no rubs. Radial and DP pulses 2+ bilaterally. GI:  Soft, nondistended, normal bowel sounds, nontender, no organomegaly, no mass, no rebound, no guarding   :  No costovertebral angle tenderness   Musculoskeletal:  No edema, no tenderness, no deformities. Back-no midline or paraspinal cervical, thoracic or lumbar tenderness. No step-offs. Chest stable. Pelvis stable. Moves both upper extremities without difficulty or pain.   She has full passive range of motion of both hips in internal and external rotation, flexion extension, both knees in flexion and extension, both ankles and inversion, eversion, plantar and dorsiflexion and all toes without difficulty or pain. Integument:  Well hydrated, no rash. Adequate perfusion. Lymphatic:  No cervical lymphadenopathy noted   Neurologic:  Alert & oriented x person and place, CN 2-12 normal,no focal deficits noted. DTRs 2+ bilateral patellar. Psychiatric:  Speech and behavior appropriate             DIAGNOSTIC RESULTS   LABS:  No results found for this visit on 03/13/23. As interpreted by me, the above displayed labs are abnormal. All other labs obtained during this visit were within normal range or not returned as of this dictation. RADIOLOGY:   Non-plain film images such as CT, Ultrasound and MRI are read by the radiologist. Plain radiographic images are visualized and preliminarily interpreted by the ED Provider with the below findings:    Bilateral hips, knees, ankles: no acute fractures    Interpretation per the Radiologist below, if available at the time of this note:    CT HEAD WO CONTRAST   Final Result   1. No acute intracranial abnormality. 2. Moderate cerebral volume loss with mild chronic microvascular ischemic   changes. XR ANKLE LEFT (MIN 3 VIEWS)   Final Result      No acute findings in the left ankle. XR ANKLE RIGHT (MIN 3 VIEWS)   Final Result      No acute findings in the right ankle. XR CHEST 1 VIEW   Final Result   No acute cardiopulmonary process. XR HIP BILATERAL W AP PELVIS (2 VIEWS)   Final Result   No acute osseous abnormality. .         XR KNEE LEFT (1-2 VIEWS)   Final Result   No acute bony abnormalities. Mild osteoarthritis. XR KNEE RIGHT (1-2 VIEWS)   Final Result   No acute bony abnormalities. Mild osteoarthritis. No results found. No results found. PROCEDURES   none          CRITICAL CARE TIME (.cct)   none    PAST MEDICAL HISTORY/Chronic Conditions Affecting Care      has a past medical history of Fracture closed, humerus.      EMERGENCY DEPARTMENT COURSE    Vitals:    Vitals:    03/13/23 1446   BP: (!) 141/79   Pulse: 82   Resp: 18   Temp: 97.6 °F (36.4 °C)   SpO2: 99%       Patient was given the following medications:  Medications - No data to display        Is this patient to be included in the SEP-1 Core Measure due to severe sepsis or septic shock? No Exclusion criteria - the patient is NOT to be included for SEP-1 Core Measure due to: Infection is not suspected        Medical Decision Making/Differential Diagnosis:    CC/HPI Summary, Social Determinants of health, Records Reviewed, DDx, testing done/not done, ED Course, Reassessment, disposition considerations/shared decision making with patient, consults, disposition:            MDM:   Chemical fall at home yesterday, patient has some dementia but is at her baseline at this time per son. Head CT is negative, no acute fracture notable on x-rays, no dislocation, she is neurovascularly intact and stable for discharge back to home. Son is in agreement. Patient is happy with this plan. Re-Evaluations:   Re-evaluation. Patients symptoms are improving  Repeat physical examination is not changed      CONSULTS: (Who and What was discussed)        Counseling: The emergency provider has spoken with the patient and son and discussed todays results, in addition to providing specific details for the plan of care and counseling regarding the diagnosis and prognosis. Questions are answered at this time and they are agreeable with the plan. I am the Primary Clinician of Record.     --------------------------------- IMPRESSION AND DISPOSITION ---------------------------------    IMPRESSION  1. Fall, initial encounter    2.  Pain in both lower extremities        DISPOSITION  Disposition: Discharge to home  Patient condition is stable    PATIENT REFERRED TO:  Wes Ortez MD  [facility] [phone]    Call in 1 day      [address] [facility] Emergency Department  [address]  [facility] [address]  [phone]  Go to   If symptoms worsen    DISCHARGE MEDICATIONS:  There are no discharge medications for this patient. DISCONTINUED MEDICATIONS:  There are no discharge medications for this patient.              (Please note that portions of this note were completed with a voice recognition program.  Efforts were made to edit the dictations but occasionally words are mis-transcribed.)    Adam Montes De Oca DO (electronically signed)            Adam Montes De Oca DO  03/15/23 0270

## 2023-03-21 ENCOUNTER — APPOINTMENT (OUTPATIENT)
Dept: CT IMAGING | Age: 83
End: 2023-03-21

## 2023-03-21 ENCOUNTER — HOSPITAL ENCOUNTER (EMERGENCY)
Age: 83
Discharge: OTHER INSTITUTION | End: 2023-03-21
Attending: EMERGENCY MEDICINE

## 2023-03-21 ENCOUNTER — APPOINTMENT (OUTPATIENT)
Dept: GENERAL RADIOLOGY | Age: 83
End: 2023-03-21

## 2023-03-21 VITALS
DIASTOLIC BLOOD PRESSURE: 77 MMHG | OXYGEN SATURATION: 99 % | SYSTOLIC BLOOD PRESSURE: 126 MMHG | RESPIRATION RATE: 18 BRPM | HEART RATE: 95 BPM | TEMPERATURE: 97.7 F

## 2023-03-21 DIAGNOSIS — S32.10XA CLOSED FRACTURE OF SACRUM, UNSPECIFIED PORTION OF SACRUM, INITIAL ENCOUNTER (HCC): ICD-10-CM

## 2023-03-21 DIAGNOSIS — S32.9XXA CLOSED NONDISPLACED FRACTURE OF PELVIS, UNSPECIFIED PART OF PELVIS, INITIAL ENCOUNTER (HCC): Primary | ICD-10-CM

## 2023-03-21 DIAGNOSIS — R26.2 AMBULATORY DYSFUNCTION: ICD-10-CM

## 2023-03-21 LAB
ALBUMIN SERPL-MCNC: 4.3 G/DL (ref 3.5–5.2)
ALP SERPL-CCNC: 128 U/L (ref 35–104)
ALT SERPL-CCNC: 13 U/L (ref 0–32)
ANION GAP SERPL CALCULATED.3IONS-SCNC: 13 MMOL/L (ref 7–16)
AST SERPL-CCNC: 23 U/L (ref 0–31)
BACTERIA URNS QL MICRO: ABNORMAL /HPF
BASOPHILS # BLD: 0.07 E9/L (ref 0–0.2)
BASOPHILS NFR BLD: 0.8 % (ref 0–2)
BILIRUB SERPL-MCNC: 1.3 MG/DL (ref 0–1.2)
BILIRUB UR QL STRIP: ABNORMAL
BUN SERPL-MCNC: 22 MG/DL (ref 6–23)
CALCIUM SERPL-MCNC: 10.5 MG/DL (ref 8.6–10.2)
CHLORIDE SERPL-SCNC: 99 MMOL/L (ref 98–107)
CK SERPL-CCNC: 79 U/L (ref 20–180)
CLARITY UR: CLEAR
CO2 SERPL-SCNC: 22 MMOL/L (ref 22–29)
COLOR UR: YELLOW
CREAT SERPL-MCNC: 0.6 MG/DL (ref 0.5–1)
EKG ATRIAL RATE: 80 BPM
EKG P AXIS: 74 DEGREES
EKG P-R INTERVAL: 134 MS
EKG Q-T INTERVAL: 408 MS
EKG QRS DURATION: 106 MS
EKG QTC CALCULATION (BAZETT): 470 MS
EKG R AXIS: 63 DEGREES
EKG T AXIS: 51 DEGREES
EKG VENTRICULAR RATE: 80 BPM
EOSINOPHIL # BLD: 0.05 E9/L (ref 0.05–0.5)
EOSINOPHIL NFR BLD: 0.6 % (ref 0–6)
EPI CELLS #/AREA URNS HPF: ABNORMAL /HPF
ERYTHROCYTE [DISTWIDTH] IN BLOOD BY AUTOMATED COUNT: 13 FL (ref 11.5–15)
GLUCOSE SERPL-MCNC: 94 MG/DL (ref 74–99)
GLUCOSE UR STRIP-MCNC: NEGATIVE MG/DL
HCT VFR BLD AUTO: 46.3 % (ref 34–48)
HGB BLD-MCNC: 15.3 G/DL (ref 11.5–15.5)
HGB UR QL STRIP: NEGATIVE
IMM GRANULOCYTES # BLD: 0.05 E9/L
IMM GRANULOCYTES NFR BLD: 0.6 % (ref 0–5)
KETONES UR STRIP-MCNC: 15 MG/DL
LEUKOCYTE ESTERASE UR QL STRIP: NEGATIVE
LYMPHOCYTES # BLD: 1.52 E9/L (ref 1.5–4)
LYMPHOCYTES NFR BLD: 18.4 % (ref 20–42)
MCH RBC QN AUTO: 32.4 PG (ref 26–35)
MCHC RBC AUTO-ENTMCNC: 33 % (ref 32–34.5)
MCV RBC AUTO: 98.1 FL (ref 80–99.9)
MONOCYTES # BLD: 0.62 E9/L (ref 0.1–0.95)
MONOCYTES NFR BLD: 7.5 % (ref 2–12)
NEUTROPHILS # BLD: 5.93 E9/L (ref 1.8–7.3)
NEUTS SEG NFR BLD: 72.1 % (ref 43–80)
NITRITE UR QL STRIP: NEGATIVE
PH UR STRIP: 5.5 [PH] (ref 5–9)
PLATELET # BLD AUTO: 416 E9/L (ref 130–450)
PMV BLD AUTO: 9.5 FL (ref 7–12)
POTASSIUM SERPL-SCNC: 4.5 MMOL/L (ref 3.5–5)
PROT SERPL-MCNC: 7.6 G/DL (ref 6.4–8.3)
PROT UR STRIP-MCNC: NEGATIVE MG/DL
RBC # BLD AUTO: 4.72 E12/L (ref 3.5–5.5)
RBC #/AREA URNS HPF: ABNORMAL /HPF (ref 0–2)
SARS-COV-2 RDRP RESP QL NAA+PROBE: NOT DETECTED
SODIUM SERPL-SCNC: 134 MMOL/L (ref 132–146)
SP GR UR STRIP: 1.02 (ref 1–1.03)
TROPONIN, HIGH SENSITIVITY: 11 NG/L (ref 0–9)
TROPONIN, HIGH SENSITIVITY: 11 NG/L (ref 0–9)
UROBILINOGEN UR STRIP-ACNC: 2 E.U./DL
WBC # BLD: 8.2 E9/L (ref 4.5–11.5)
WBC #/AREA URNS HPF: ABNORMAL /HPF (ref 0–5)

## 2023-03-21 PROCEDURE — 97165 OT EVAL LOW COMPLEX 30 MIN: CPT

## 2023-03-21 PROCEDURE — 85025 COMPLETE CBC W/AUTO DIFF WBC: CPT

## 2023-03-21 PROCEDURE — 99285 EMERGENCY DEPT VISIT HI MDM: CPT

## 2023-03-21 PROCEDURE — 93010 ELECTROCARDIOGRAM REPORT: CPT | Performed by: INTERNAL MEDICINE

## 2023-03-21 PROCEDURE — 81001 URINALYSIS AUTO W/SCOPE: CPT

## 2023-03-21 PROCEDURE — 36415 COLL VENOUS BLD VENIPUNCTURE: CPT

## 2023-03-21 PROCEDURE — 73521 X-RAY EXAM HIPS BI 2 VIEWS: CPT

## 2023-03-21 PROCEDURE — 84484 ASSAY OF TROPONIN QUANT: CPT

## 2023-03-21 PROCEDURE — 87635 SARS-COV-2 COVID-19 AMP PRB: CPT

## 2023-03-21 PROCEDURE — 82550 ASSAY OF CK (CPK): CPT

## 2023-03-21 PROCEDURE — 93005 ELECTROCARDIOGRAM TRACING: CPT | Performed by: STUDENT IN AN ORGANIZED HEALTH CARE EDUCATION/TRAINING PROGRAM

## 2023-03-21 PROCEDURE — 2580000003 HC RX 258: Performed by: STUDENT IN AN ORGANIZED HEALTH CARE EDUCATION/TRAINING PROGRAM

## 2023-03-21 PROCEDURE — 97161 PT EVAL LOW COMPLEX 20 MIN: CPT | Performed by: PHYSICAL THERAPIST

## 2023-03-21 PROCEDURE — 80053 COMPREHEN METABOLIC PANEL: CPT

## 2023-03-21 PROCEDURE — 74176 CT ABD & PELVIS W/O CONTRAST: CPT

## 2023-03-21 PROCEDURE — 71045 X-RAY EXAM CHEST 1 VIEW: CPT

## 2023-03-21 RX ORDER — 0.9 % SODIUM CHLORIDE 0.9 %
500 INTRAVENOUS SOLUTION INTRAVENOUS ONCE
Status: COMPLETED | OUTPATIENT
Start: 2023-03-21 | End: 2023-03-21

## 2023-03-21 RX ADMIN — SODIUM CHLORIDE 500 ML: 9 INJECTION, SOLUTION INTRAVENOUS at 09:57

## 2023-03-21 ASSESSMENT — ENCOUNTER SYMPTOMS
SHORTNESS OF BREATH: 0
VOMITING: 0
COUGH: 0
NAUSEA: 0
COLOR CHANGE: 0
DIARRHEA: 0
BACK PAIN: 0
ABDOMINAL PAIN: 0

## 2023-03-21 NOTE — PROGRESS NOTES
[address] [facility] [address]  [facility]. [place]        Date:3/21/2023                                                  Patient Name: Otf Maher    MRN: [id number]    : 1940    Room:      Evaluating OT: Star Carrera OTR/L #[id number]     Referring Provider and Specific Provider Orders/Date:      23 1300  OT eval and treat  Start:  23 1300,   End:  23 1300,   ONE TIME,   Standing Count:  1 Occurrences,   R         Juvenal Bashir, DO      Placement Recommendation: Subacute Rehab        Diagnosis:   No diagnosis found. Surgery: None       Pertinent Medical History:       Past Medical History:   Diagnosis Date    Fracture closed, humerus          Past Surgical History:   Procedure Laterality Date     SECTION       Precautions:  Fall Risk, falls and alarm, dementia, 50% weight bearing left lower extremity (per PT conversation with orthopedic resident after therapy evaluations were completed; awaiting written orders)     CT Abdomen Pelvis  WO Contrast 3/21/23:   Acute right pelvic and left sacral fractures       Cholelithiasis. Constipation and diverticulosis.       Assessment of current deficits    [x] Functional mobility  [x]ADLs  [x] Strength               [x]Cognition    [x] Functional transfers   [x] IADLs         [x] Safety Awareness   [x]Endurance    [] Fine Coordination              [x] Balance      [] Vision/perception   []Sensation     []Gross Motor Coordination  [] ROM  [] Delirium                   [] Motor Control     OT PLAN OF CARE   OT POC based on physician orders, patient diagnosis and results of clinical assessment    Frequency/Duration 1-3 days/wk for 2 weeks PRN     Specific OT Treatment Interventions to include:   * Instruction/training on adapted ADL techniques and AE recommendations to increase functional independence within precautions       * Training on
AM-PAC Basic Mobility - Inpatient   How much help is needed turning from your back to your side while in a flat bed without using bedrails?: A Lot  How much help is needed moving from lying on your back to sitting on the side of a flat bed without using bedrails?: Total  How much help is needed moving to and from a bed to a chair?: Total  How much help is needed standing up from a chair using your arms?: Total  How much help is needed walking in hospital room?: Total  How much help is needed climbing 3-5 steps with a railing?: Total  AM-PAC Inpatient Mobility Raw Score : 7  AM-PAC Inpatient T-Scale Score : 26.42  Mobility Inpatient CMS 0-100% Score: 92.36  Mobility Inpatient CMS G-Code Modifier : CM    Nursing cleared patient for PT evaluation. The admitting diagnosis and active problem list as listed above have been reviewed prior to the initiation of this evaluation. OBJECTIVE;   Initial Evaluation  Date: 3/21/2023 Treatment Date:     Short Term/ Long Term   Goals   Was pt agreeable to Eval/treatment? Yes  To be met in 5 days   Pain level   5/10  Left lower extremity      Bed Mobility  Using rails and head of bed elevated:     Rolling: Maximal assist of 1    Supine to sit: Moderate assist of  2    Sit to supine: Maximal assist of  2    Scooting: Maximal assist of 1    Rolling: Supervision     Supine to sit: Supervision     Sit to supine: Supervision     Scooting: Supervision      Transfers Sit to stand: Not assessed  wt bearing status not available during encounter  Sit to stand:  Moderate assist of 1     Ambulation    not assessed     10 feet using  wheeled walker with 50%  wt bearing on Left lower extremity     ROM impaired bilateral lower extremities d/t pain   Increase range of motion 10% of affected joints    Strength BUE:  refer to OT eval  RLE:  2/5  LLE:  2/5  Increase strength in affected mm groups by 1/3 grade   Balance Sitting EOB:  fair    Dynamic Standing:  not assessed    Sitting EOB:  good

## 2023-03-21 NOTE — CONSULTS
humerus, shoulder or clavicle. -- Patient able to flex/extend fingers, wrist, elbow and shoulder with active and passive ROM without pain, +2/4 Radial pulse, cap refill <3sec, +AIN/PIN/Radial/Ulnar/Median N, distal sensation grossly intact to C4-T1 dermatomes, compartments soft and compressible. bilateralLE: No obvious signs of trauma. -TTP to foot, ankle, leg, knee, thigh, hip distal sensation grossly intact to L4-S1 dermatomes,         DATA:    CBC:   Lab Results   Component Value Date/Time    WBC 8.2 03/21/2023 09:55 AM    RBC 4.72 03/21/2023 09:55 AM    HGB 15.3 03/21/2023 09:55 AM    HCT 46.3 03/21/2023 09:55 AM    MCV 98.1 03/21/2023 09:55 AM    MCH 32.4 03/21/2023 09:55 AM    MCHC 33.0 03/21/2023 09:55 AM    RDW 13.0 03/21/2023 09:55 AM     03/21/2023 09:55 AM    MPV 9.5 03/21/2023 09:55 AM     PT/INR:    Lab Results   Component Value Date/Time    PROTIME 12.8 08/08/2021 05:41 AM    INR 1.1 08/08/2021 05:41 AM       Radiology Review:  CT abdomen pelvis on 3-21 demonstrate an acute superior and inferior pubic rami fracture displaced on the right side, there is also a mildly displaced sacral ala fracture on the left side zone 1.      IMPRESSION:  Right superior and inferior pubic rami fracture  Left sacral ala fracture    PLAN:  Ambulatory assist with gait training  Appreciate PT OT  Weightbearing bilateral lower extremities  Multimodal pain management per ED  Patient will follow up on outpatient basis with Dr. Perico Galeas medical optimization  No acute intervention by orthopedic surgery  Patient is okay for discharge from orthopedic surgery standpoint      Will discuss with attending     Dewayne Chen DO  PGY-1 Orthopedic Surgery

## 2023-03-21 NOTE — CARE COORDINATION
if can admit. Javier spoke to [facility]. Completed referral as backup plan for SNF placement. SOV declines pt and CAMILLE updated [facility].    [address]  Pt accepted @ [facility]. CAMILLE called [facility] and set up stretcher transport. ETA is [phone]. CAMILLE updated Summer[facility]RN of POC. CAMILLE also called pt's Dtr, Leila Ahumada & informed her. Karla- Public  noted pt does not have Medicare Part B. She spoke to pt's son Juan Antonio Manzo who noted pt quit paying the monthly $160 premium. Pt needs to go to [facility] office and pay $750.00 and it will be reinstated. In meantime, pt has no coverage for this ED visit as well as 3/13 visit. CAMILLE directed her to call pt's son to do marybel application for pt, as pt unable to provide needed information. CAMILLE completed PAS[facility] - ID: [id number]. CAMILLE updated pt on POC and her son Nirali Zapata is @ bedside.   Electronically signed by HALEY Dickey on 3/21/2023 at 4:14 PM

## 2023-03-21 NOTE — DISCHARGE INSTR - COC
Continuity of Care Form    Patient Name: Eben Joya   :  1940  MRN:  [id number]    Admit date:  3/21/2023  Discharge date:  3/21/23    Code Status Order: Prior   Advance Directives:     Admitting Physician:  No admitting provider for patient encounter. PCP: Marquis Mar MD    [facility]ing [facility]   Discharging Unit Phone Number: [phone]    Emergency Contact:   Extended Emergency Contact Information  Primary Emergency Contact: Darrick Winkler Phone: [phone]  Mobile Phone: [phone]  Relation: Child   needed? No  Secondary Emergency Contact: Ehsan Camara  Address: [address] [place] [address] Phone: [phone]  Mobile Phone: [phone]  Relation: Child   needed?  No    Past Surgical History:  Past Surgical History:   Procedure Laterality Date     SECTION         Immunization History:   Immunization History   Administered Date(s) Administered    COVID-19, MODERNA BLUE border, Primary or Immunocompromised, (age 12y+), IM, 100 mcg/0.5mL 2021, 2021       Active Problems:  Patient Active Problem List   Diagnosis Code    Adult failure to thrive R62.7    Fracture closed, humerus S42.309A    Urinary incontinence R32    Failure to thrive in adult R62.7    Onychomycosis B35.1    Alcohol abuse, uncomplicated R68.02    Muscle weakness (generalized) M62.81    Other lack of coordination P82.7    Other symbolic dysfunctions Q36.3    Unspecified severe protein-calorie malnutrition ([facility] [address] E43       Isolation/Infection:   Isolation            No Isolation          Patient Infection Status       Infection Onset Added Last Indicated Last Indicated By Review Planned Expiration Resolved Resolved By    None active    Resolved    COVID-19 (Rule Out) 23 COVID-19, Rapid (Ordered)   23 Rule-Out Test Resulted    COVID-19 (Rule Out) 21

## 2023-03-21 NOTE — ED NOTES
Pt arrived soiled in urine, pt cleaned, changed into gown and complete bed change performed      [address]  03/21/23 1772

## 2023-03-21 NOTE — ED PROVIDER NOTES
known allergies.     -------------------------------------------------- RESULTS -------------------------------------------------  Labs:  Results for orders placed or performed during the hospital encounter of 03/21/23   COVID-19, Rapid    Specimen: Nasopharyngeal Swab   Result Value Ref Range    SARS-CoV-2, NAAT Not Detected Not Detected   CBC with Auto Differential   Result Value Ref Range    WBC 8.2 4.5 - 11.5 E9/L    RBC 4.72 3.50 - 5.50 E12/L    Hemoglobin 15.3 11.5 - 15.5 g/dL    Hematocrit 46.3 34.0 - 48.0 %    MCV 98.1 80.0 - 99.9 fL    MCH 32.4 26.0 - 35.0 pg    MCHC 33.0 32.0 - 34.5 %    RDW 13.0 11.5 - 15.0 fL    Platelets 802 448 - 001 E9/L    MPV 9.5 7.0 - 12.0 fL    Neutrophils % 72.1 43.0 - 80.0 %    Immature Granulocytes % 0.6 0.0 - 5.0 %    Lymphocytes % 18.4 (L) 20.0 - 42.0 %    Monocytes % 7.5 2.0 - 12.0 %    Eosinophils % 0.6 0.0 - 6.0 %    Basophils % 0.8 0.0 - 2.0 %    Neutrophils Absolute 5.93 1.80 - 7.30 E9/L    Immature Granulocytes # 0.05 E9/L    Lymphocytes Absolute 1.52 1.50 - 4.00 E9/L    Monocytes Absolute 0.62 0.10 - 0.95 E9/L    Eosinophils Absolute 0.05 0.05 - 0.50 E9/L    Basophils Absolute 0.07 0.00 - 0.20 E9/L   Comprehensive Metabolic Panel w/ Reflex to MG   Result Value Ref Range    Sodium 134 132 - 146 mmol/L    Potassium reflex Magnesium 4.5 3.5 - 5.0 mmol/L    Chloride 99 98 - 107 mmol/L    CO2 22 22 - 29 mmol/L    Anion Gap 13 7 - 16 mmol/L    Glucose 94 74 - 99 mg/dL    BUN 22 6 - 23 mg/dL    Creatinine 0.6 0.5 - 1.0 mg/dL    Est, Glom Filt Rate >60 >=60 mL/min/1.73    Calcium 10.5 (H) 8.6 - 10.2 mg/dL    Total Protein 7.6 6.4 - 8.3 g/dL    Albumin 4.3 3.5 - 5.2 g/dL    Total Bilirubin 1.3 (H) 0.0 - 1.2 mg/dL    Alkaline Phosphatase 128 (H) 35 - 104 U/L    ALT 13 0 - 32 U/L    AST 23 0 - 31 U/L   Troponin   Result Value Ref Range    Troponin, High Sensitivity 11 (H) 0 - 9 ng/L   Urinalysis with Microscopic   Result Value Ref Range    Color, UA Yellow Straw/Yellow    Clarity,

## 2023-07-15 ENCOUNTER — HOSPITAL ENCOUNTER (EMERGENCY)
Age: 83
Discharge: HOME | End: 2023-07-15

## 2023-07-15 ENCOUNTER — APPOINTMENT (OUTPATIENT)
Dept: CT IMAGING | Age: 83
End: 2023-07-15

## 2023-07-15 VITALS
WEIGHT: 95 LBS | OXYGEN SATURATION: 100 % | BODY MASS INDEX: 17.38 KG/M2 | DIASTOLIC BLOOD PRESSURE: 79 MMHG | TEMPERATURE: 98.2 F | SYSTOLIC BLOOD PRESSURE: 104 MMHG | HEART RATE: 83 BPM | RESPIRATION RATE: 20 BRPM

## 2023-07-15 DIAGNOSIS — S01.81XA FACIAL LACERATION, INITIAL ENCOUNTER: ICD-10-CM

## 2023-07-15 DIAGNOSIS — S09.90XA INJURY OF HEAD, INITIAL ENCOUNTER: Primary | ICD-10-CM
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
## 2023-07-15 PROCEDURE — 70450 CT HEAD/BRAIN W/O DYE: CPT

## 2023-07-15 PROCEDURE — 12001 RPR S/N/AX/GEN/TRNK 2.5CM/<: CPT

## 2023-07-15 PROCEDURE — 72125 CT NECK SPINE W/O DYE: CPT

## 2023-07-15 PROCEDURE — 99211 OFF/OP EST MAY X REQ PHY/QHP: CPT

## 2023-07-15 PROCEDURE — 2500000003 HC RX 250 WO HCPCS: Performed by: NURSE PRACTITIONER

## 2023-07-15 PROCEDURE — 99212 OFFICE O/P EST SF 10 MIN: CPT

## 2023-07-15 RX ORDER — LIDOCAINE HYDROCHLORIDE 10 MG/ML
5 INJECTION, SOLUTION INFILTRATION; PERINEURAL ONCE
Status: COMPLETED | OUTPATIENT
Start: 2023-07-15 | End: 2023-07-15

## 2023-07-15 RX ADMIN — LIDOCAINE HYDROCHLORIDE 5 ML: 10 INJECTION, SOLUTION INFILTRATION; PERINEURAL at 14:33

## 2023-07-26 ENCOUNTER — HOSPITAL ENCOUNTER (EMERGENCY)
Age: 83
Discharge: HOME | End: 2023-07-26

## 2023-07-26 VITALS
HEART RATE: 73 BPM | DIASTOLIC BLOOD PRESSURE: 67 MMHG | HEIGHT: 66 IN | RESPIRATION RATE: 18 BRPM | BODY MASS INDEX: 16.07 KG/M2 | OXYGEN SATURATION: 100 % | SYSTOLIC BLOOD PRESSURE: 108 MMHG | TEMPERATURE: 98.3 F | WEIGHT: 100 LBS

## 2023-07-26 DIAGNOSIS — Z48.02 ENCOUNTER FOR REMOVAL OF SUTURES: Primary | ICD-10-CM

## 2023-07-26 PROCEDURE — 99211 OFF/OP EST MAY X REQ PHY/QHP: CPT

## 2024-10-03 ENCOUNTER — TELEPHONE (OUTPATIENT)
Dept: FAMILY MEDICINE CLINIC | Age: 84
End: 2024-10-03

## 2024-10-03 NOTE — TELEPHONE ENCOUNTER
I called patient's home, spoke w/her son, Lorenzo, to ask if patient has a new PCP or is wanting to continue seeing Dr. Yi.  Lorenzo informed me that patient will continue to see Dr. Yi and he will contact the [facility] in the future to make her an appt.

## 2024-11-24 NOTE — CARE COORDINATION
8/9/2021: SS Note/Discharge plan:  Notified by Lukasz Caputo admissions liaison for [facility] that pt was accepted for [facility] placement, NO PRE CERT NEEDED, NO COVID TESTING NEEDED if pt asymptomatic, ALEXANDRE generated in Epic, HENS exemption completed, pt's son Zackery Davey (p# [phone]) notified and request to be notified when his mother is transferred to facility, sw/cm to follow for medical discharge and to confirm transfer arrangements.   Electronically signed by HALEY Elmore on 8/9/2021 at 10:16 AM IV intact